# Patient Record
Sex: FEMALE | Race: WHITE | NOT HISPANIC OR LATINO | Employment: FULL TIME | URBAN - NONMETROPOLITAN AREA
[De-identification: names, ages, dates, MRNs, and addresses within clinical notes are randomized per-mention and may not be internally consistent; named-entity substitution may affect disease eponyms.]

---

## 2017-01-04 ENCOUNTER — OFFICE VISIT - GICH (OUTPATIENT)
Dept: FAMILY MEDICINE | Facility: OTHER | Age: 32
End: 2017-01-04

## 2017-01-04 ENCOUNTER — HISTORY (OUTPATIENT)
Dept: FAMILY MEDICINE | Facility: OTHER | Age: 32
End: 2017-01-04

## 2017-01-04 DIAGNOSIS — J02.9 ACUTE PHARYNGITIS: ICD-10-CM

## 2017-01-04 DIAGNOSIS — J02.0 STREPTOCOCCAL PHARYNGITIS: ICD-10-CM

## 2017-01-04 LAB — STREP A ANTIGEN - HISTORICAL: POSITIVE

## 2017-01-04 ASSESSMENT — PATIENT HEALTH QUESTIONNAIRE - PHQ9: SUM OF ALL RESPONSES TO PHQ QUESTIONS 1-9: 0

## 2017-03-28 ENCOUNTER — OFFICE VISIT - GICH (OUTPATIENT)
Dept: PEDIATRICS | Facility: OTHER | Age: 32
End: 2017-03-28

## 2017-03-28 ENCOUNTER — COMMUNICATION - GICH (OUTPATIENT)
Dept: INTERNAL MEDICINE | Facility: OTHER | Age: 32
End: 2017-03-28

## 2017-03-28 ENCOUNTER — HISTORY (OUTPATIENT)
Dept: PEDIATRICS | Facility: OTHER | Age: 32
End: 2017-03-28

## 2017-03-28 DIAGNOSIS — J01.90 ACUTE SINUSITIS: ICD-10-CM

## 2017-03-28 DIAGNOSIS — B96.89 OTHER SPECIFIED BACTERIAL AGENTS AS THE CAUSE OF DISEASES CLASSIFIED ELSEWHERE: ICD-10-CM

## 2017-06-19 ENCOUNTER — HISTORY (OUTPATIENT)
Dept: FAMILY MEDICINE | Facility: OTHER | Age: 32
End: 2017-06-19

## 2017-06-19 ENCOUNTER — OFFICE VISIT - GICH (OUTPATIENT)
Dept: FAMILY MEDICINE | Facility: OTHER | Age: 32
End: 2017-06-19

## 2017-06-19 DIAGNOSIS — Z00.00 ENCOUNTER FOR GENERAL ADULT MEDICAL EXAMINATION WITHOUT ABNORMAL FINDINGS: ICD-10-CM

## 2017-07-19 ENCOUNTER — AMBULATORY - GICH (OUTPATIENT)
Dept: FAMILY MEDICINE | Facility: OTHER | Age: 32
End: 2017-07-19

## 2017-07-19 ENCOUNTER — HISTORY (OUTPATIENT)
Dept: FAMILY MEDICINE | Facility: OTHER | Age: 32
End: 2017-07-19

## 2017-07-19 ENCOUNTER — OFFICE VISIT - GICH (OUTPATIENT)
Dept: FAMILY MEDICINE | Facility: OTHER | Age: 32
End: 2017-07-19

## 2017-07-19 DIAGNOSIS — N87.9 DYSPLASIA OF CERVIX UTERI: ICD-10-CM

## 2017-07-19 DIAGNOSIS — Z00.00 ENCOUNTER FOR GENERAL ADULT MEDICAL EXAMINATION WITHOUT ABNORMAL FINDINGS: ICD-10-CM

## 2017-07-28 LAB — HPV RESULTS - HISTORICAL: NEGATIVE

## 2017-12-27 NOTE — PROGRESS NOTES
Patient Information     Patient Name MRN Sex Megan Calderon [id number] Female 1985      Progress Notes by Rhea Sampson MD at 2017  5:39 PM     Author:  Rhea Sampson MD Service:  (none) Author Type:  Physician     Filed:  2017  5:39 PM Encounter Date:  2017 Status:  Signed     :  Rhea Sampson MD (Physician)            Notified of results via [employer]

## 2017-12-27 NOTE — PROGRESS NOTES
Patient Information     Patient Name MRN Sex Megan Calderon [id number] Female 1985      Progress Notes by Rhea Sampson MD at 2017  3:26 PM     Author:  Rhea Sampson MD Service:  (none) Author Type:  Physician     Filed:  2017  3:26 PM Encounter Date:  2017 Status:  Signed     :  Rhea Sampson MD (Physician)            Notified of results via [employer].

## 2017-12-27 NOTE — PROGRESS NOTES
Patient Information     Patient Name MRN Sex Megan Calderon [id number] Female 1985      Progress Notes by Rhea Sampson MD at 2017 12:45 PM     Author:  Rhea Sampson MD Service:  (none) Author Type:  Physician     Filed:  2017  8:08 AM Encounter Date:  2017 Status:  Signed     :  Rhea Sampson MD (Physician)            physical rescheduled.

## 2017-12-27 NOTE — PROGRESS NOTES
Patient Information     Patient Name MRN Sex     Megan Staples [id number] Female 1985      Progress Notes by Rhea Sampson MD at 2017  3:15 PM     Author:  Rhea Sampson MD Service:  (none) Author Type:  Physician     Filed:  2017  5:27 PM Encounter Date:  2017 Status:  Signed     :  Rhea Sampson MD (Physician)            ANNUAL PHYSICAL - FEMALE    HPI: Megan Staples is a 31 y.o. female who presents for a yearly exam.  Concerns include:     LSIL pap  with colposcopy, biopsy SINGH 1; normal pap with + high risk HPV , colp done during second trimester of pregnancy with exam findings consistent with mild dysplasia (no biopsies done) and plan to do PP colp and biopsies. PP colp was not done. Patient presents for physical 17. Plan for repeat pap and HPV, follow up depending on results.     Patient's last menstrual period was 2017 (exact date).   Contraception: tubal  Risk for STI?: no  Last pap: see above  Any hx of abnormal paps:  See abobe  FH of early CA?: no  Tobacco?: former  Calcium intake: no  DEXA: yes  Last mammo:  not indicated   Colonoscopy:  not indicated   Immunizations: up to date    Patient Active Problem List       Diagnosis  Date Noted     Encounter for sterilization  2015     Sterilization consult  2015     Supervision of normal pregnancy in second trimester  2015     OB Provider: TINO  Baby Doc: TBD  EDC: Estimated Date of Delivery: 12/9/15   Dates based on: early ultrasound  Blood Type: A Rh Positive   Rubella: +  Flu Shot:   Tdap: 2015   Sex of baby: girl on US  Circ planned: n/a  GBS:  Breastfeeding: (give breastfeeding book at 28w visit):   Vacuum Consent Reviewed: (around 28 weeks):          Cervical dysplasia  2015     LSIL pap  with colposcopy, biopsy SINGH 1; normal pap with + high risk HPV , colp done during second trimester of pregnancy with exam findings consistent with mild dysplasia (no biopsies done)  "and plan to do PP colp and biopsies. PP colp was not done. Patient presents for physical 17. Plan for repeat pap and HPV, follow up depending on results.         Tonsillith  2014     ECZEMA  2010     OTHER CHRONIC DISEASE OF TONSILS AND ADENOIDS  2010     ALLERGIC RHINITIS         Past Medical History:     Diagnosis  Date     SAB (spontaneous )     cytotec used for medical treatment      Uterine mass      Vaginal delivery     x 3        Past Surgical History:      Procedure  Laterality Date     LAPAROSCOPY ABDOMEN DIAGNOSTIC      Diagnostic laparoscopy       KY LIGATE FALLOPIAN TUBEPOSTPARTUM  2015              Social History     Social History        Marital status:       Spouse name: N/A     Number of children:  N/A     Years of education:  N/A     Occupational History      Not on file.     Social History Main Topics        Smoking status:  Former Smoker     Quit date: 2009     Smokeless tobacco:  Never Used     Alcohol use  No     Drug use:  No     Sexual activity:  Yes     Partners: Male     Other Topics  Concern     Not on file      Social History Narrative     . : João, works for [employer]    Patient works as  at [employer]    Son: DO SawyerB 02    Daughter: Mary  3/9/10    Daughter: María  11/26/15               Family History       Problem   Relation Age of Onset     Good Health  Mother      Heart Disease  Father      Atrial fibrillation       Allergies  Daughter        No current outpatient prescriptions on file.     No current facility-administered medications for this visit.      Medications have been reviewed by me and are current to the best of my knowledge and ability.       REVIEW OF SYSTEMS:  15 system ROS completed and negative other than: See HPI.    PHYSICAL EXAM:  /62  Pulse 76  Ht 1.638 m (5' 4.5\")  Wt 73.3 kg (161 lb 9.6 oz)  LMP 2017 (Exact Date)  " Breastfeeding? No  BMI 27.31 kg/m2  CONSTITUTIONAL:  Alert, cooperative, NAD.  EYES: No scleral icterus.  PERRLA.  Conjunctiva clear.  ENT/MOUTH: External ears and nose normal.  TMs normal.  Moist mucous membranes. Oropharynx clear.    ENDO: No thyromegaly or thyroid nodules.  LYMPH:  No cervical or supraclavicular LA.    BREASTS: No skin abnormalities, no erythema.  No discrete masses.  No nipple discharge, no axillary, supra- or infraclavicular LA.   CARDIOVASCULAR: Regular, S1, S2.  No S3 or S4.  No murmur/gallop/rub.  No peripheral edema.  RESPIRATORY: CTA bilaterally, no wheezes, rhonchi or rales.  GI: Bowel sounds wnl.  Soft, nontender, nondistended.  No masses or HSM.  No rebound or guarding.  : Vulva: normal, no lesions or discharge  Urethral meatus: normal size and location, no lesions or discharge  Urethra: no tenderness or masses  Bladder: no fullness or tenderness  Vagina: normal appearance, no abnormal discharge, no lesions.  No evidence of cystocele or rectocele.  Cervix: normal appearance, no lesions, no abnormal discharge, no cervical motion tenderness  Uterus: normal size and position, mobile, non-tender  Pap smear obtained: yes  Adnexa: no palpable masses bilaterally  MSKEL: Grossly normal ROM.  No clubbing.  INTEGUMENTARY:  Warm, dry.  No rash noted on exposed skin.  NEUROLOGIC: Facies symmetric.  Grossly normal movement and tone.  No tremor.  PSYCHIATRIC: Affect normal.  Speech fluent.  Though content linear.     ASSESSMENT/PLAN:    ICD-10-CM    1. Health care maintenance Z00.00    2. Cervical dysplasia N87.9 GYN THIN PREP PAP SCREEN IMAGED      GYN THIN PREP PAP SCREEN IMAGED       Relevant cancer screening discussed.    Counseled on healthy diet, Calcium and vitamin D intake, and exercise.    Rhea Sampson MD

## 2017-12-28 NOTE — ADDENDUM NOTE
Patient Information     Patient Name MRN Megan Guevara [id number] Female 1985      Addendum Note by Yael Collier at 2017  2:02 PM     Author:  Yael Collier Service:  (none) Author Type:  (none)     Filed:  2017  2:02 PM Encounter Date:  2017 Status:  Signed     :  Yael Collier       Addended by: YAEL COLLIER on: 2017 02:02 PM        Modules accepted: Orders

## 2017-12-30 NOTE — NURSING NOTE
Patient Information     Patient Name MRN Sex Megan Calderon [id number] Female 1985      Nursing Note by Lois Tanner at 2017  3:15 PM     Author:  Lois Tanner Service:  (none) Author Type:  (none)     Filed:  2017  3:36 PM Encounter Date:  2017 Status:  Signed     :  Lois Tanner            Patient here for yearly physical.  Lois Tanner LPN ..........2017 3:20 PM

## 2017-12-30 NOTE — NURSING NOTE
Patient Information     Patient Name MRN Sex Megan Calderon [id number] Female 1985      Nursing Note by Lois Tanner at 2017 12:45 PM     Author:  Lois Tanner Service:  (none) Author Type:  (none)     Filed:  2017  1:59 PM Encounter Date:  2017 Status:  Signed     :  Lois Tanner            Patient  Here for yearly physical.  Lois Tanner LPN ..........2017 12:49 PM

## 2018-01-02 NOTE — PROGRESS NOTES
Patient Information     Patient Name MRN Sex Megan Calderon [id number] Female 1985      Progress Notes by Anamika Simms NP at 2017  9:30 AM     Author:  Anamika Simms NP Service:  (none) Author Type:  PHYS- Nurse Practitioner     Filed:  2017 10:12 AM Encounter Date:  2017 Status:  Signed     :  Anamika Simms NP (PHYS- Nurse Practitioner)            Nursing Notes:   Elvis Betancourt  2017  9:20 AM  Signed  Patient here with a bit of a sore throat.  Elvis Betancourt CMA..............2017   9:19 AM    SUBJECTIVE:    Megan Staples is a 31 y.o. female who presents for sore throat for one day    Pharyngitis    This is a new problem. The current episode started yesterday. The problem has been unchanged. The pain is worse on the right side. There has been no fever. The pain is moderate. Associated symptoms include swollen glands. Pertinent negatives include no congestion, coughing, drooling, hoarse voice, neck pain, shortness of breath, stridor or trouble swallowing. She has had no exposure to strep or mono. She has tried NSAIDs for the symptoms. The treatment provided moderate relief.       No current outpatient prescriptions on file prior to visit.     No current facility-administered medications on file prior to visit.        REVIEW OF SYSTEMS:  Review of Systems   HENT: Negative for congestion, drooling, hoarse voice and trouble swallowing.    Respiratory: Negative for cough, shortness of breath and stridor.    Musculoskeletal: Negative for neck pain.       OBJECTIVE:  /70  Pulse 76  Temp 98.4  F (36.9  C) (Tympanic)  Wt 74.8 kg (165 lb)  BMI 27.46 kg/m2    EXAM:   Physical Exam   Constitutional: She is well-developed, well-nourished, and in no distress.   HENT:   Head: Normocephalic and atraumatic.   Right Ear: Tympanic membrane and ear canal normal.   Left Ear: Tympanic membrane and ear canal normal.   Nose: Nose normal.   Mouth/Throat: Uvula is  midline. Oropharyngeal exudate and posterior oropharyngeal erythema present. No posterior oropharyngeal edema.   Bilateral Tonsil +2, RT with exudates   Eyes: Conjunctivae are normal.   Neck: Neck supple.   RT sided Lymph node swelling.    Cardiovascular: Normal rate, regular rhythm and normal heart sounds.    Pulmonary/Chest: Effort normal and breath sounds normal. No respiratory distress. She has no wheezes. She has no rales.   Lymphadenopathy:     She has cervical adenopathy.        Right cervical: Superficial cervical and posterior cervical adenopathy present.   Skin: Skin is warm and dry. No rash noted.   Nursing note and vitals reviewed.    Results for orders placed or performed in visit on 01/04/17      THROAT RAPID STREP A WITH REFLEX      Result  Value Ref Range    STREP A ANTIGEN           Positive (A) Negative     Completed labs at today's visit Rapid Strep.  I personally reviewed the labs with the patient/parent at the visit. Abnormalities include + strep.     ASSESSMENT/PLAN:    ICD-10-CM    1. Sore throat J02.9 THROAT RAPID STREP A WITH REFLEX      THROAT RAPID STREP A WITH REFLEX   2. Strep throat J02.0 azithromycin (ZITHROMAX) 250 mg tablet      DISCONTINUED: penicillin g benzathine 1,200,000 Units injection (BICILLIN-LA)        Plan:  At first she wanted a shot of PCN, then realized she had to go to the store and wanted oral Abx. Rx sent in. Home cares and otc gone over. Hand hygiene discussed. I explained my diagnostic considerations and recommendations to the patient, who voiced understanding and agreement with the treatment plan. All questions were answered. We discussed potential side effects of any prescribed or recommended therapies, as well as expectations for response to treatments. She was advised to contact our office if there is no improvement or worsening of conditions or symptoms.  If s/s worsen or persist, patient will either come back or follow up with PCP.       AMBER OLIVA NP  ....................  1/4/2017   10:12 AM

## 2018-01-02 NOTE — NURSING NOTE
Patient Information     Patient Name MRN Megan Guevara [id number] Female 1985      Nursing Note by Elvis Betancourt at 2017  9:30 AM     Author:  Elvis Betancourt Service:  (none) Author Type:  (none)     Filed:  2017  9:20 AM Encounter Date:  2017 Status:  Signed     :  Elvis Betancourt            Patient here with a bit of a sore throat.  Elvis Betancourt CMA..............2017   9:19 AM

## 2018-01-02 NOTE — PATIENT INSTRUCTIONS
Patient Information     Patient Name MRN Megan Guevara [id number] Female 1985      Patient Instructions by Anamika Simms NP at 2017  9:30 AM     Author:  Anamika Simms NP Service:  (none) Author Type:  PHYS- Nurse Practitioner     Filed:  2017  9:44 AM Encounter Date:  2017 Status:  Signed     :  Anamika Simms NP (PHYS- Nurse Practitioner)            Cold Medicines   What are cold medicines?  Symptoms of the common cold start gradually over several days and usually last about two weeks. Symptoms may include sneezing, a stuffy or runny nose, sore throat, cough, watery eyes, mild headache, or body aches. A cold will go away on its own without treatment. However, there are many nonprescription products that may help relieve some of the symptoms of a cold. Cold medicines often contain more than one ingredient and are used to treat more than one symptom. Read the labels and buy products that have only the ingredients that you need. If you are not sure which medicine is best, ask your pharmacist.  How do they work?  Decongestants reduce swelling in your nose and sinuses. They may also lessen the amount of mucus made by your nose. If you use decongestants more often than directed, your stuffy nose may get worse.   Antihistamines block the effect of histamine. Histamine is a chemical your body makes when you have an allergic reaction. Antihistamines are most often used to treat itchy or watery eyes or a stuffy or runny nose caused by an allergy. Antihistamines may not help a stuffy or runny nose caused by a cold because they can make mucus thick and dry.  Mucolytics are medicines that make mucus thinner so that it is easier to cough up out of your throat and lungs.  Expectorants are cough medicines that may help to keep the mucus thin and bring up mucus from the lungs when you cough. This may relieve chest congestion and make it easier to breathe.   Cough suppressants  (antitussives) are medicines that lessen the urge to cough. They may give relief from a dry, hacking cough. If you have a cough that is wet sounding and produces mucus, it is important for you to cough the mucus up out of your lungs. For this reason, cough suppressants are not recommended for a wet sounding cough.  Fever and pain relievers, such as acetaminophen, aspirin, or other nonsteroidal anti-inflammatory drugs (NSAIDs), are often included in cold medicine. Read labels carefully to avoid taking more medicine than you need.  What else do I need to know about this medicine?    Talk to your healthcare provider if your symptoms start suddenly or you have severe symptoms. This may mean you have something more serious than a cold.    Follow the directions that come with your medicine, including information about food or alcohol. Make sure you know how and when to take your medicine. Do not take more or less than you are supposed to take.    Try to get all of your medicine at the same place. Your pharmacist can help make sure that all of your medicines are safe to take together.    Keep a list of your medicines with you. List all of the prescription medicines, nonprescription medicines, supplements, natural remedies, and vitamins that you take. Tell all healthcare providers who treat you about all of the products you are taking.    Many medicines have side effects. A side effect is a symptom or problem that is caused by the medicine. Ask your healthcare provider or pharmacist what side effects the medicine may cause and what you should do if you have side effects.    Nonsteroidal anti-inflammatory medicines (NSAIDs), such as ibuprofen, naproxen, and aspirin, may cause stomach bleeding and other problems. These risks increase with age. Read the label and take as directed. Unless recommended by your healthcare provider, do not take for more than 10 days for any reason.    Acetaminophen may cause liver damage or other  problems. Unless recommended by your provider, don't take more than 3000 milligrams (mg) in 24 hours. To make sure you don t take too much, check other medicines you take to see if they also contain acetaminophen. Ask your provider if you need to avoid drinking alcohol while taking this medicine.  If you have any questions, ask your healthcare provider or pharmacist for more information. Be sure to keep all appointments for provider visits or tests.

## 2018-01-04 NOTE — NURSING NOTE
Patient Information     Patient Name MRN Megan Guevara [id number] Female 1985      Nursing Note by Mary Jo Richard at 3/28/2017  3:15 PM     Author:  Mary Jo Richard Service:  (none) Author Type:  (none)     Filed:  3/28/2017  3:34 PM Encounter Date:  3/28/2017 Status:  Signed     :  Mary Jo Richard            Patient presents to clinic for ongoing cough and congestion for about 2-3 weeks ago.  Mary Jo Richard LPN ....................  3/28/2017   3:01 PM

## 2018-01-04 NOTE — TELEPHONE ENCOUNTER
Patient Information     Patient Name MRN Sex Megan Calderon [id number] Female 1985      Telephone Encounter by Rhea Wharton at 3/28/2017  8:00 AM     Author:  Rhea Wharton Service:  (none) Author Type:  (none)     Filed:  3/28/2017  8:06 AM Encounter Date:  3/28/2017 Status:  Signed     :  Rhea Wharton            PATIENT WOULD LIKE TO BE SEEN FOR PNEUMONIA AT DAUGHTERS APPOINTMENT AT 3:15 TODAY.  PLEASE CALL TO DISCUSS AND ADVISE.  Rhea Wharton ....................  3/28/2017   8:06 AM

## 2018-01-04 NOTE — TELEPHONE ENCOUNTER
Patient Information     Patient Name MRN Sex Megan Calderon [id number] Female 1985      Telephone Encounter by Valerio Howard MD at 3/28/2017  8:56 AM     Author:  Valerio Howard MD Service:  (none) Author Type:  Physician     Filed:  3/28/2017  8:56 AM Encounter Date:  3/28/2017 Status:  Signed     :  Valerio Howard MD (Physician)            Okay to double book.    Signed, Valerio Howard MD  [facility] Medicine [facility]

## 2018-01-04 NOTE — PATIENT INSTRUCTIONS
Patient Information     Patient Name MRN Megan Guevara [id number] Female 1985      Patient Instructions by Valerio Howard MD at 3/28/2017  3:15 PM     Author:  Valerio Howard MD Service:  (none) Author Type:  Physician     Filed:  3/28/2017  3:45 PM Encounter Date:  3/28/2017 Status:  Signed     :  Valerio Howard MD (Physician)             -- Augmentin   -- Eat yogurt 1-2 times per day while on antibiotics (and for a few weeks after) to reduce the chances of diarrhea     -- Use nasal saline spray and/or Neti pot (with distilled water)   -- Salt water gargle a few times per day for sore throat   -- For nasal congestion, okay to use Afrin 2 sprays both nostrils daily, no more than 3-4 days then stop as can cause rebound congestion   -- Benadryl 25 mg up to 3x/day   -- Drink warm liquids (eg apple juice, tea, chicken soup)   -- Look for benzocaine sore throat drops   -- Honey mixed with hot water or tea for cough   -- Over-the-counter cough/cold medications not recommended   -- Okay to use acetaminophen (Tylenol) and ibuprofen (Advil)   -- Watch for dehydration, try to stay hydrated   -- If symptoms are not improving over 7-10 days, or worse at any point return for evaluation.

## 2018-01-04 NOTE — TELEPHONE ENCOUNTER
Patient Information     Patient Name MRN Megan Guevara [id number] Female 1985      Telephone Encounter by Mary Jo Richard at 3/28/2017  8:40 AM     Author:  Mary Jo Richard Service:  (none) Author Type:  (none)     Filed:  3/28/2017  8:41 AM Encounter Date:  3/28/2017 Status:  Signed     :  Mary Jo Richard            Would you like to work this mother in with her daughter at 3:15 today?  Mary Jo Richard LPN ....................  3/28/2017   8:41 AM

## 2018-01-04 NOTE — TELEPHONE ENCOUNTER
Patient Information     Patient Name MRN Megan Guevara [id number] Female 1985      Telephone Encounter by Mary Jo Richard at 3/28/2017  9:01 AM     Author:  Mary Jo Richard Service:  (none) Author Type:  (none)     Filed:  3/28/2017  9:01 AM Encounter Date:  3/28/2017 Status:  Signed     :  Mary Jo Richard            Patient notified and will be here at 3:00 pm with daughter.  Mary Jo Richard LPN ....................  3/28/2017   9:01 AM

## 2018-01-26 VITALS
OXYGEN SATURATION: 97 % | HEART RATE: 68 BPM | BODY MASS INDEX: 26.79 KG/M2 | TEMPERATURE: 98.6 F | HEIGHT: 65 IN | SYSTOLIC BLOOD PRESSURE: 104 MMHG | BODY MASS INDEX: 27.49 KG/M2 | WEIGHT: 165 LBS | DIASTOLIC BLOOD PRESSURE: 78 MMHG | HEART RATE: 98 BPM | WEIGHT: 161 LBS | DIASTOLIC BLOOD PRESSURE: 62 MMHG | SYSTOLIC BLOOD PRESSURE: 106 MMHG

## 2018-01-26 VITALS
BODY MASS INDEX: 28.31 KG/M2 | TEMPERATURE: 98.4 F | WEIGHT: 165 LBS | HEART RATE: 76 BPM | SYSTOLIC BLOOD PRESSURE: 120 MMHG | DIASTOLIC BLOOD PRESSURE: 70 MMHG

## 2018-01-26 VITALS
DIASTOLIC BLOOD PRESSURE: 62 MMHG | BODY MASS INDEX: 26.92 KG/M2 | HEIGHT: 65 IN | WEIGHT: 161.6 LBS | SYSTOLIC BLOOD PRESSURE: 102 MMHG | HEART RATE: 76 BPM

## 2018-01-29 ENCOUNTER — DOCUMENTATION ONLY (OUTPATIENT)
Dept: FAMILY MEDICINE | Facility: OTHER | Age: 33
End: 2018-01-29

## 2018-01-29 PROBLEM — J30.9 ALLERGIC RHINITIS: Status: ACTIVE | Noted: 2018-01-29

## 2018-02-02 ASSESSMENT — PATIENT HEALTH QUESTIONNAIRE - PHQ9: SUM OF ALL RESPONSES TO PHQ QUESTIONS 1-9: 0

## 2019-05-17 ENCOUNTER — OFFICE VISIT (OUTPATIENT)
Dept: FAMILY MEDICINE | Facility: OTHER | Age: 34
End: 2019-05-17
Attending: NURSE PRACTITIONER
Payer: COMMERCIAL

## 2019-05-17 VITALS
RESPIRATION RATE: 14 BRPM | WEIGHT: 150.5 LBS | HEART RATE: 76 BPM | BODY MASS INDEX: 25.08 KG/M2 | TEMPERATURE: 98.2 F | HEIGHT: 65 IN | DIASTOLIC BLOOD PRESSURE: 70 MMHG | SYSTOLIC BLOOD PRESSURE: 104 MMHG

## 2019-05-17 DIAGNOSIS — N30.01 ACUTE CYSTITIS WITH HEMATURIA: Primary | ICD-10-CM

## 2019-05-17 DIAGNOSIS — R39.9 UTI SYMPTOMS: ICD-10-CM

## 2019-05-17 LAB
ALBUMIN UR-MCNC: NEGATIVE MG/DL
APPEARANCE UR: CLEAR
BACTERIA #/AREA URNS HPF: ABNORMAL /HPF
BILIRUB UR QL STRIP: NEGATIVE
COLOR UR AUTO: YELLOW
GLUCOSE UR STRIP-MCNC: NEGATIVE MG/DL
HGB UR QL STRIP: ABNORMAL
KETONES UR STRIP-MCNC: NEGATIVE MG/DL
LEUKOCYTE ESTERASE UR QL STRIP: ABNORMAL
NITRATE UR QL: NEGATIVE
PH UR STRIP: 5.5 PH (ref 5–9)
RBC #/AREA URNS AUTO: ABNORMAL /HPF
SOURCE: ABNORMAL
SP GR UR STRIP: 1.01 (ref 1–1.03)
UROBILINOGEN UR STRIP-ACNC: 0.2 EU/DL (ref 0.2–1)
WBC #/AREA URNS AUTO: ABNORMAL /HPF

## 2019-05-17 PROCEDURE — 99214 OFFICE O/P EST MOD 30 MIN: CPT | Performed by: NURSE PRACTITIONER

## 2019-05-17 PROCEDURE — 87086 URINE CULTURE/COLONY COUNT: CPT | Mod: ZL | Performed by: NURSE PRACTITIONER

## 2019-05-17 PROCEDURE — 81001 URINALYSIS AUTO W/SCOPE: CPT | Mod: ZL | Performed by: NURSE PRACTITIONER

## 2019-05-17 RX ORDER — NITROFURANTOIN 25; 75 MG/1; MG/1
100 CAPSULE ORAL 2 TIMES DAILY
Qty: 10 CAPSULE | Refills: 0 | Status: SHIPPED | OUTPATIENT
Start: 2019-05-17 | End: 2019-09-04

## 2019-05-17 ASSESSMENT — PAIN SCALES - GENERAL: PAINLEVEL: NO PAIN (0)

## 2019-05-17 ASSESSMENT — MIFFLIN-ST. JEOR: SCORE: 1383.54

## 2019-05-17 NOTE — NURSING NOTE
Patient presents to clinic today for dysuria starting a couple days ago.     No LMP recorded.  Medication Reconciliation: complete    Alberta Wharton LPN  5/17/2019 10:46 AM

## 2019-05-17 NOTE — PROGRESS NOTES
"HPI:    Megan Staples is a 33 year old female  who presents to clinic today for UTI concerns.    Symptoms for the past 3 days.  Symptoms include dysuria, painful urination, and pressure.  No frequency or urgency.  Normal urination amount.  No fevers.  Chills.  No nausea or vomiting.  No change in appetite.  Drinking extra fluids.  No diarrhea or constipation.  No abdominal pain.  No back pain.  Scant blood initially with wiping, no blood noted in urine.  LMP - due in about a week.  No pregnancy concerns.  No STD concerns.  No vaginal itching or discharge.  No hx of frequent UTIs.  No change in soaps or new products.      No OTC medications.        Past Medical History:   Diagnosis Date     Complete or unspecified spontaneous  without complication     cytotec used for medical treatment     Encounter for full-term uncomplicated delivery     x 3     Noninflammatory disorder of uterus     No Comments Provided     Past Surgical History:   Procedure Laterality Date     LAPAROSCOPY DIAGNOSTIC (GENERAL)      ,Diagnostic laparoscopy     OTHER SURGICAL HISTORY      2015,83382.0,[place] LIGATE FALLOPIAN TUBEPOSTPARTUM     Social History     Tobacco Use     Smoking status: Former Smoker     Last attempt to quit: 2009     Years since quitting: 10.1     Smokeless tobacco: Never Used   Substance Use Topics     Alcohol use: No     Alcohol/week: 0.0 oz     No current outpatient medications on file.     No Known Allergies      Past medical history, past surgical history, current medications and allergies reviewed and accurate to the best of my knowledge.        ROS:  Refer to HPI    /70 (BP Location: Right arm, Patient Position: Sitting, Cuff Size: Adult Regular)   Pulse 76   Temp 98.2  F (36.8  C) (Tympanic)   Resp 14   Ht 1.651 m (5' 5\")   Wt 68.3 kg (150 lb 8 oz)   LMP  (Within Weeks)   BMI 25.04 kg/m      EXAM:  General Appearance: Well appearing adult female, non ill appearance, appropriate " appearance for age. No acute distress  Eyes: conjunctivae normal without erythema or irritation, no drainage or crusting, no eyelid swelling, pupils equal   Orophayrnx: moist mucous membranes, voice clear.  Respiratory: normal chest wall and respirations.  Normal effort.  Clear to auscultation bilaterally, no wheezing, crackles or rhonchi.  No increased work of breathing.  No cough appreciated.  Cardiac: RRR with no murmurs  Abdomen: soft, nontender, no masses or organomegally, no rebound tenderness or guarding, normal bowel sounds present  :  Mild suprapubic tenderness/pressure to palpation.  No CVA tenderness to palpation.    Musculoskeletal:  Normal gait.  Equal movement of bilateral upper extremities.  Equal movement of bilateral lower extremities.    Psychological: normal affect, alert and pleasant      Labs:  Results for orders placed or performed in visit on 05/17/19   *UA reflex to Microscopic   Result Value Ref Range    Color Urine Yellow     Appearance Urine Clear     Glucose Urine Negative NEG^Negative mg/dL    Bilirubin Urine Negative NEG^Negative    Ketones Urine Negative NEG^Negative mg/dL    Specific Gravity Urine 1.010 1.000 - 1.030    Blood Urine Small (A) NEG^Negative    pH Urine 5.5 5.0 - 9.0 pH    Protein Albumin Urine Negative NEG^Negative mg/dL    Urobilinogen Urine 0.2 0.2 - 1.0 EU/dL    Nitrite Urine Negative NEG^Negative    Leukocyte Esterase Urine Small (A) NEG^Negative    Source Midstream Urine    Urine Microscopic   Result Value Ref Range    WBC Urine 5-10 (A) OTO5^0 - 5 /HPF    RBC Urine 2-5 (A) OTO2^O - 2 /HPF    Bacteria Urine Few (A) NEG^Negative /HPF             ASSESSMENT/PLAN:  1. UTI symptoms    - *UA reflex to Microscopic  - Urine Microscopic  - Urine Culture Aerobic Bacterial    2. Acute cystitis with hematuria      Urinalysis - small blood, small leukocyte estrace, negative nitrite  Urine microscopic - trace RBCs, small WBCs, few bacteria    Urine culture pending  Will call  if culture warrants change of abx.     - nitroFURantoin macrocrystal-monohydrate (MACROBID) 100 MG capsule; Take 1 capsule (100 mg) by mouth 2 times daily for 5 days  Dispense: 10 capsule; Refill: 0    May use Pyridium OTC PRN.     Encouraged fluids and frequent bladder emptying.    Discussed warning signs/symptoms indicative of need to f/u    Follow up if symptoms persist or worsen or concerns

## 2019-05-19 LAB
BACTERIA SPEC CULT: NO GROWTH
SPECIMEN SOURCE: NORMAL

## 2019-09-04 ENCOUNTER — OFFICE VISIT (OUTPATIENT)
Dept: FAMILY MEDICINE | Facility: OTHER | Age: 34
End: 2019-09-04
Attending: FAMILY MEDICINE
Payer: COMMERCIAL

## 2019-09-04 VITALS
HEIGHT: 65 IN | TEMPERATURE: 98.7 F | DIASTOLIC BLOOD PRESSURE: 68 MMHG | WEIGHT: 158.6 LBS | RESPIRATION RATE: 16 BRPM | SYSTOLIC BLOOD PRESSURE: 104 MMHG | HEART RATE: 78 BPM | BODY MASS INDEX: 26.42 KG/M2

## 2019-09-04 DIAGNOSIS — Z00.00 HEALTH CARE MAINTENANCE: Primary | ICD-10-CM

## 2019-09-04 DIAGNOSIS — N89.8 VAGINAL DISCHARGE: ICD-10-CM

## 2019-09-04 LAB
CHOLEST SERPL-MCNC: 173 MG/DL
HDLC SERPL-MCNC: 71 MG/DL (ref 23–92)
LDLC SERPL CALC-MCNC: 94 MG/DL
NONHDLC SERPL-MCNC: 102 MG/DL
SPECIMEN SOURCE: NORMAL
TRIGL SERPL-MCNC: 39 MG/DL
WET PREP SPEC: NORMAL

## 2019-09-04 PROCEDURE — 99395 PREV VISIT EST AGE 18-39: CPT | Performed by: FAMILY MEDICINE

## 2019-09-04 PROCEDURE — 80061 LIPID PANEL: CPT | Mod: ZL | Performed by: FAMILY MEDICINE

## 2019-09-04 PROCEDURE — 36415 COLL VENOUS BLD VENIPUNCTURE: CPT | Mod: ZL | Performed by: FAMILY MEDICINE

## 2019-09-04 PROCEDURE — 87210 SMEAR WET MOUNT SALINE/INK: CPT | Mod: ZL | Performed by: FAMILY MEDICINE

## 2019-09-04 ASSESSMENT — PAIN SCALES - GENERAL: PAINLEVEL: NO PAIN (0)

## 2019-09-04 ASSESSMENT — MIFFLIN-ST. JEOR: SCORE: 1420.28

## 2019-09-04 NOTE — PROGRESS NOTES
SUBJECTIVE:   Megan Staples is a 33 year old female who presents to clinic today for a physical:     Patient notes occasional L pelvic pain around the time of ovulation followed by thick, clear vaginal discharge.       Contraception: tubal  Risk for STI?: no  Last pap: 2017 with neg HPV  Any hx of abnormal paps:  Yes, see problem list  FH of early CA?: no  Tobacco?: no  Calcium intake: yes  DEXA:  not indicated   Last mammo:  not indicated   Colonoscopy:  not indicated   Immunizations: up to date        Patient Active Problem List    Diagnosis Date Noted     Allergic rhinitis 2018     Priority: Medium     Cervical dysplasia 2015     Priority: Medium     Overview:   LSIL pap  with colposcopy, biopsy SINGH 1; normal pap with + high risk HPV , colp done during second trimester of pregnancy with exam findings consistent with mild dysplasia (no biopsies done) and plan to do PP colp and biopsies. PP colp was not done. Patient presents for physical 17, pap and HPV testing negative. Resume normal intervals.        Tonsillith 2014     Priority: Medium     Contact dermatitis and eczema 2010     Priority: Medium     Other chronic disease of tonsils and adenoids 2010     Priority: Medium     Past Medical History:   Diagnosis Date     Complete or unspecified spontaneous  without complication     cytotec used for medical treatment     Encounter for full-term uncomplicated delivery     x 3     Noninflammatory disorder of uterus     No Comments Provided      Past Surgical History:   Procedure Laterality Date     LAPAROSCOPY DIAGNOSTIC (GENERAL)      ,Diagnostic laparoscopy     OTHER SURGICAL HISTORY      2015,94991.0,[place] LIGATE FALLOPIAN TUBEPOSTPARTUM     Family History   Problem Relation Age of Onset     Family History Negative Mother         Good Health     Heart Disease Father         Heart Disease,Atrial fibrillation     No Known Problems Sister      Allergy  "(Severe) Daughter         Allergies     Social History     Tobacco Use     Smoking status: Former Smoker     Last attempt to quit: 2009     Years since quitting: 10.4     Smokeless tobacco: Never Used   Substance Use Topics     Alcohol use: No     Alcohol/week: 0.0 oz     Social History     Social History Narrative    . : João, works for [employer]  Patient works as  at [employer]  Son: Sawyer,  02  Daughter: Mary,  3/9/10  Daughter: María  11/26/15         Review of Systems see HPI, ROS otherwise negative.     OBJECTIVE:     /68 (BP Location: Right arm, Patient Position: Sitting, Cuff Size: Adult Regular)   Pulse 78   Temp 98.7  F (37.1  C) (Tympanic)   Resp 16   Ht 1.651 m (5' 5\")   Wt 71.9 kg (158 lb 9.6 oz)   LMP 2019   BMI 26.39 kg/m    Body mass index is 26.39 kg/m .  Physical Exam   Constitutional: She is oriented to person, place, and time. She appears well-developed and well-nourished.   HENT:   TMs appear normal.    Eyes: Conjunctivae are normal.   Neck: No thyromegaly present.   Cardiovascular: Normal rate, regular rhythm and normal heart sounds.   No murmur heard.  Pulmonary/Chest: Effort normal and breath sounds normal. No respiratory distress.   Abdominal: Soft.   Genitourinary:   Genitourinary Comments: Pelvic exam: normal vagina and vulva, cervix without tenderness, uterus normal size anteverted, adenxa normal in size without tenderness.     Musculoskeletal: She exhibits no edema.   Lymphadenopathy:     She has no cervical adenopathy.   Neurological: She is alert and oriented to person, place, and time.   Skin: No rash noted.   Psychiatric: She has a normal mood and affect.       ASSESSMENT/PLAN:           ICD-10-CM    1. Health care maintenance Z00.00 Lipid Panel       Patient description is consistent with mitisatuschmerz, overall reassured, exam normal. Reviewed current cervical cancer screening " recommendations. Fasting lipids today.     Relevant cancer screening discussed.    Counseled on healthy diet, Calcium and vitamin D intake, and exercise.       Rhea Sampson MD  [facility] AND [facility]

## 2019-09-04 NOTE — NURSING NOTE
"Patient here for yearly physical.   Lois Tanner LPN ..........9/4/2019 8:19 AM   Chief Complaint   Patient presents with     Physical     yearly       Initial /68 (BP Location: Right arm, Patient Position: Sitting, Cuff Size: Adult Regular)   Pulse 78   Temp 98.7  F (37.1  C) (Tympanic)   Resp 16   Ht 1.651 m (5' 5\")   Wt 71.9 kg (158 lb 9.6 oz)   LMP 08/05/2019   BMI 26.39 kg/m   Estimated body mass index is 26.39 kg/m  as calculated from the following:    Height as of this encounter: 1.651 m (5' 5\").    Weight as of this encounter: 71.9 kg (158 lb 9.6 oz).  Medication Reconciliation: complete    Lois Tanner LPN    "

## 2020-01-13 ENCOUNTER — OFFICE VISIT (OUTPATIENT)
Dept: FAMILY MEDICINE | Facility: OTHER | Age: 35
End: 2020-01-13
Attending: FAMILY MEDICINE
Payer: COMMERCIAL

## 2020-01-13 DIAGNOSIS — T75.3XXA MOTION SICKNESS, INITIAL ENCOUNTER: Primary | ICD-10-CM

## 2020-01-13 PROCEDURE — 99213 OFFICE O/P EST LOW 20 MIN: CPT | Performed by: FAMILY MEDICINE

## 2020-01-13 RX ORDER — SCOLOPAMINE TRANSDERMAL SYSTEM 1 MG/1
1 PATCH, EXTENDED RELEASE TRANSDERMAL
Qty: 6 PATCH | Refills: 0 | Status: SHIPPED | OUTPATIENT
Start: 2020-01-13 | End: 2021-11-02

## 2020-01-13 ASSESSMENT — PAIN SCALES - GENERAL: PAINLEVEL: NO PAIN (0)

## 2020-01-13 NOTE — NURSING NOTE
"Patient is here to discuss medication for motion sickness. She leaves on 1/28/2020 for cruise, will be on the boat for about a week.   Lois Tanner LPN ..........1/13/2020 8:13 AM     Chief Complaint   Patient presents with     Medication Request     motion sickness meds       Initial There were no vitals taken for this visit. Estimated body mass index is 26.39 kg/m  as calculated from the following:    Height as of 9/4/19: 1.651 m (5' 5\").    Weight as of 9/4/19: 71.9 kg (158 lb 9.6 oz).  Medication Reconciliation: complete    Lois Tanner LPN    "

## 2020-01-13 NOTE — PROGRESS NOTES
SUBJECTIVE:   Megan Staples is a 34 year old female who presents to clinic today for the following health issues:    Going on a cruise for the first time ever. Concerned about motion sickness.         Review of Systems see HPI, ROS otherwise negative.     OBJECTIVE:     BP (P) 108/62 (BP Location: Right arm, Patient Position: Sitting, Cuff Size: Adult Regular)   Pulse (P) 72   Temp (P) 98.6  F (37  C) (Tympanic)   Resp (P) 18   Wt (P) 73.3 kg (161 lb 9.6 oz)   LMP 12/27/2019   BMI (P) 26.89 kg/m    Body mass index is 26.89 kg/m  (pended).  Physical Exam  Constitutional:       Appearance: She is well-developed.   HENT:      Right Ear: External ear normal.      Left Ear: External ear normal.   Eyes:      General: No scleral icterus.     Conjunctiva/sclera: Conjunctivae normal.   Cardiovascular:      Rate and Rhythm: Normal rate.   Pulmonary:      Effort: Pulmonary effort is normal. No respiratory distress.   Skin:     Findings: No rash.   Neurological:      Mental Status: She is alert.         ASSESSMENT/PLAN:           ICD-10-CM    1. Motion sickness, initial encounter T75.3XXA scopolamine (TRANSDERM) 1 MG/3DAYS 72 hr patch     Discussed options. Recommend scopolamine patches. Prescription sent. Reviewed side effects.     Rhea Sampson MD  [facility] AND [facility]

## 2020-02-10 ENCOUNTER — OFFICE VISIT (OUTPATIENT)
Dept: FAMILY MEDICINE | Facility: OTHER | Age: 35
End: 2020-02-10
Attending: NURSE PRACTITIONER
Payer: COMMERCIAL

## 2020-02-10 VITALS
OXYGEN SATURATION: 98 % | HEART RATE: 76 BPM | WEIGHT: 161.9 LBS | RESPIRATION RATE: 16 BRPM | TEMPERATURE: 97.7 F | HEIGHT: 64 IN | SYSTOLIC BLOOD PRESSURE: 110 MMHG | DIASTOLIC BLOOD PRESSURE: 72 MMHG | BODY MASS INDEX: 27.64 KG/M2

## 2020-02-10 DIAGNOSIS — K13.70 ORAL MUCOSAL LESION: Primary | ICD-10-CM

## 2020-02-10 PROCEDURE — 99213 OFFICE O/P EST LOW 20 MIN: CPT | Performed by: NURSE PRACTITIONER

## 2020-02-10 ASSESSMENT — MIFFLIN-ST. JEOR: SCORE: 1413.99

## 2020-02-10 ASSESSMENT — PAIN SCALES - GENERAL: PAINLEVEL: NO PAIN (0)

## 2020-02-10 NOTE — NURSING NOTE
"Chief Complaint   Patient presents with     Pharyngitis     x 4 days       Initial /72   Pulse 76   Temp 97.7  F (36.5  C) (Tympanic)   Resp 16   Ht 1.625 m (5' 3.98\")   Wt 73.4 kg (161 lb 14.4 oz)   SpO2 98%   BMI 27.81 kg/m   Estimated body mass index is 27.81 kg/m  as calculated from the following:    Height as of this encounter: 1.625 m (5' 3.98\").    Weight as of this encounter: 73.4 kg (161 lb 14.4 oz).  Medication Reconciliation: complete    Maria Luisa Gracia LPN  "

## 2020-02-10 NOTE — PROGRESS NOTES
"HPI:    Megan Staples is a 34 year old female  who presents to clinic today for spots on throat.    Back of throat with spots.  Corner of lip feels numb.  Symptoms started about 4 days ago.  No pain with swallowing.  Burning in chest.  Just returned home from cruise to [place], [facility], and [facility].  Ate a lot of seafood - calarari and scallops which are atypical.  Increased fruit juices.  No throat swelling.  No fevers.  No cough.  No vomiting or diarrhea.  No headaches or body aches.  No GERD.  Increased thirst.  No change in appetite.  Wore a scopolamine patch while on cruise.    Using Listerine mouth rinse which is helping a lot.          Past Medical History:   Diagnosis Date     Complete or unspecified spontaneous  without complication     cytotec used for medical treatment     Encounter for full-term uncomplicated delivery     x 3     Noninflammatory disorder of uterus     No Comments Provided     Past Surgical History:   Procedure Laterality Date     LAPAROSCOPY DIAGNOSTIC (GENERAL)      dysmenorrhea     TUBAL LIGATION       Social History     Tobacco Use     Smoking status: Former Smoker     Last attempt to quit: 2009     Years since quitting: 10.8     Smokeless tobacco: Never Used   Substance Use Topics     Alcohol use: No     Alcohol/week: 0.0 standard drinks     Current Outpatient Medications   Medication Sig Dispense Refill     scopolamine (TRANSDERM) 1 MG/3DAYS 72 hr patch Place 1 patch onto the skin every 72 hours (Patient not taking: Reported on 2/10/2020) 6 patch 0     No Known Allergies      Past medical history, past surgical history, current medications and allergies reviewed and accurate to the best of my knowledge.        ROS:  Refer to HPI    /72   Pulse 76   Temp 97.7  F (36.5  C) (Tympanic)   Resp 16   Ht 1.625 m (5' 3.98\")   Wt 73.4 kg (161 lb 14.4 oz)   SpO2 98%   BMI 27.81 kg/m      EXAM:  General Appearance: Well appearing adult female, appropriate " appearance for age. No acute distress  Eyes: conjunctivae normal without erythema or irritation, corneas clear, no drainage or crusting, no eyelid swelling, pupils equal   Orophayrnx: moist mucous membranes, buccal mucosa without lesions or leukoplakia, hard palate with few scattered light erythematous oral ulcers/flat lesions, posterior pharynx with mild erythema, irritation, and few oral ulcer/flat lesions, tonsils without hypertrophy, no erythema, no exudates or petechiae, no post nasal drip seen, no trismus, voice clear, tongue without erythema, leukoplakia, or lesions.    Nose: no drainage or congestion   Neck: supple without adenopathy  Respiratory: normal chest wall and respirations.  Normal effort.  Clear to auscultation bilaterally, no wheezing, crackles or rhonchi.  No increased work of breathing.  No cough appreciated, oxygen saturation 98%  Cardiac: RRR with no murmurs  Musculoskeletal:  Equal movement of bilateral upper extremities.  Equal movement of bilateral lower extremities.  Normal gait.    Psychological: normal affect, alert, oriented, and pleasant.         ASSESSMENT/PLAN:  1. Oral mucosal lesion    Patient has a few small scattered flat ulcers in her oral cavity.  She was recently of a cruise ship to [place], [facility], and [place].  She states she ate lots of seafood and increased fruit juices.  She also wore a scopolamine patch.  The combination of dry mouth from the patch along with the different foods may have caused her some oral mucosal irritation.  She is non ill feeling and appearing with no fevers and no other signs/symptoms of illness.    Recommend she continue with oral rinses and follow up if no improvement.    Discussed warning signs/symptoms indicative of need to f/u    Follow up if symptoms persist or worsen or concerns      I explained my diagnostic considerations and recommendations to the patient, who voiced understanding and agreement with the treatment plan. All questions were  answered. We discussed potential side effects of any prescribed or recommended therapies, as well as expectations for response to treatments.    Disclaimer:  This note consists of words and symbols derived from keyboarding, dictation, or using voice recognition software. As a result, there may be errors in the script that have gone undetected. Please consider this when interpreting information found in this note.

## 2020-03-11 ENCOUNTER — HEALTH MAINTENANCE LETTER (OUTPATIENT)
Age: 35
End: 2020-03-11

## 2020-04-29 ENCOUNTER — TELEPHONE (OUTPATIENT)
Dept: TRANSPLANT | Facility: CLINIC | Age: 35
End: 2020-04-29

## 2020-04-29 DIAGNOSIS — Z00.5 TRANSPLANT DONOR EVALUATION: Primary | ICD-10-CM

## 2020-04-29 NOTE — TELEPHONE ENCOUNTER
"Close Window   Print This Page   Expand All  Collapse All  [facility] BREEZE  [id number]      LIVING KIDNEY DONOR EVALUATION  Donor First Name Megan Donor MRN    Donor Last Name Jhoan Completed 2020 4:13 PM    1985 Record ID [id number]   BREEZE Screen PASSED     Intended Recipient  Recipient First Name Triny Recipient MRN    Recipient Last Name Bobby Relationship Not related (Other)   Recipient   Recipient Diagnosis    Recipient's ABO      Donor Information  Age 34 Gender Female   Ht 165 cm (5' 5'') Race    Wt 76.2 kg (168 lbs) Ethnicity Not /   BMI 28.00 kg/m  Preferred Language English      Required No     Blood Type Unknown   Demographics  Home Address [address] [place] [address] # [phone]   [facility] Type [facility] #    Zip Code [address] Type    Country [place] Preferred Contact day Mon, Wed, Tue   Email [email or web address] Preferred Contact time 09:00 AM-11:00 AM   &&   Donor's Medical Information  Medical History Cervical Dysplasia   Dysfunctional Uterine Bleeding   History of    History of miscarriage   History of pregnancy Medications None Reported   Surgical History None Reported Allergies NKDA   Social History EtOH: Rare (1-2 drinks/month)   Illicit Drug Use: Denies   Tobacco: Remote; Quit ; (1/2 ppd x 5 years) Self-Reported Functional Status \"I am able to participate in strenuous sports such as swimming, singles tennis, football, basketball, or skiing\"   Family Medical History Cancer (denies)   Diabetes (Grandparent)   Heart Disease (Father)   Hypertension (denies)   Kidney Disease (denies)   Kidney Stones (denies) Exercise Frequency Exercise (Not on a regular basis)   Review of Organ Systems  Review of Systems Airway or Lungs: No   Blood Disorder: No   Cancer: No   Diabetes,Thyroid,Adrenal,Endocrine Disorder: No   Digestive or Liver: No   Female Health: Yes   Heart or Circulatory System: No "   Immune Diseases: No   Kidneys and Bladder: No   Muscles,Bones,Joints: No   Neuro: No   Psych: No   &&   Donor's Social Information  Marital Status  Living Accommodation Owns own home/apartment   Level of Education Some college education Living Arrangement With spouse   Employment Status Part Time Concerns: health and life insurance No   Employer [employer] Concerns: job security and lost income No   Occupation      Medical Insurance Status Has medical insurance     High Risk Behavior  High Risk Behaviors Blood transfusion < 12 months. (NO)   Commercial sex < 12 months. (NO)   Illicit IV drug use < 5yrs. (NO)   Other high risk sexual contact < 12 months. (NO)   Reason for Donation  Referral Tx Candidate Reason for Donation It s for my sons grandmother.   Permission to Disclose Inquiry Yes Patient Comments    Donor Motivation Level Highly motivated donor     PCP Contact  PCP Name Rhea Sampson   PCP [facility]   PCP Phone [phone]   Emergency Contact  First Name João First Name Sawyer   Last Name Jhoan Last Name Estuardo   Phone # [phone] Phone # [phone]   Phone Type Mobile Phone Type Mobile   Relationship Spouse Relationship Son   Office Use  Reviewed By    Reviewed 4/29/2020 11:49 AM   Admin Folder Archive   Comments    Lost for Followup    Extended Comments    BREEZE ID [id number] survey status completed   Activity History  Call  Task    Due Date 4/29/2020   Last Modified Date/Time 4/29/2020 10:39 AM   Comments

## 2020-06-10 ENCOUNTER — ALLIED HEALTH/NURSE VISIT (OUTPATIENT)
Dept: FAMILY MEDICINE | Facility: OTHER | Age: 35
End: 2020-06-10
Attending: TRANSPLANT SURGERY

## 2020-06-10 VITALS
SYSTOLIC BLOOD PRESSURE: 112 MMHG | BODY MASS INDEX: 27.82 KG/M2 | DIASTOLIC BLOOD PRESSURE: 80 MMHG | HEIGHT: 65 IN | WEIGHT: 167 LBS

## 2020-06-10 DIAGNOSIS — Z00.5 TRANSPLANT DONOR EVALUATION: ICD-10-CM

## 2020-06-10 DIAGNOSIS — Z00.5 EXAMINATION OF POTENTIAL DONOR OF ORGAN AND TISSUE: Primary | ICD-10-CM

## 2020-06-10 LAB
ABO + RH BLD: NORMAL
ABO + RH BLD: NORMAL
ALBUMIN MFR UR ELPH: 10 MG/DL (ref 1–14)
ALBUMIN UR-MCNC: NEGATIVE MG/DL
APPEARANCE UR: CLEAR
BACTERIA #/AREA URNS HPF: ABNORMAL /HPF
BILIRUB UR QL STRIP: NEGATIVE
COLOR UR AUTO: ABNORMAL
CREAT SERPL-MCNC: 0.68 MG/DL (ref 0.6–1.2)
CREAT UR-MCNC: 94 MG/DL
CREAT UR-MCNC: 99 MG/DL
GFR SERPL CREATININE-BSD FRML MDRD: >90 ML/MIN/{1.73_M2}
GLUCOSE SERPL-MCNC: 101 MG/DL (ref 70–105)
GLUCOSE UR STRIP-MCNC: NEGATIVE MG/DL
HGB BLD-MCNC: 12.7 G/DL (ref 11.7–15.7)
HGB UR QL STRIP: NEGATIVE
KETONES UR STRIP-MCNC: NEGATIVE MG/DL
LEUKOCYTE ESTERASE UR QL STRIP: ABNORMAL
MICROALBUMIN UR-MCNC: 6 MG/L
MICROALBUMIN/CREAT UR: 6.49 MG/G CR (ref 0–25)
MUCOUS THREADS #/AREA URNS LPF: PRESENT /LPF
NITRATE UR QL: NEGATIVE
PH UR STRIP: 7 PH (ref 5–7)
PROT/CREAT 24H UR: 0.11 MG/G{CREAT}
RBC #/AREA URNS AUTO: 3 /HPF (ref 0–2)
SOURCE: ABNORMAL
SP GR UR STRIP: 1.02 (ref 1–1.03)
SPECIMEN EXP DATE BLD: NORMAL
SQUAMOUS #/AREA URNS AUTO: 5 /HPF (ref 0–1)
UROBILINOGEN UR STRIP-MCNC: NORMAL MG/DL (ref 0–2)
WBC #/AREA URNS AUTO: 1 /HPF (ref 0–5)

## 2020-06-10 PROCEDURE — 86900 BLOOD TYPING SEROLOGIC ABO: CPT | Mod: ZL | Performed by: TRANSPLANT SURGERY

## 2020-06-10 PROCEDURE — 84156 ASSAY OF PROTEIN URINE: CPT | Mod: ZL | Performed by: TRANSPLANT SURGERY

## 2020-06-10 PROCEDURE — 82565 ASSAY OF CREATININE: CPT | Mod: ZL | Performed by: TRANSPLANT SURGERY

## 2020-06-10 PROCEDURE — 82043 UR ALBUMIN QUANTITATIVE: CPT | Mod: ZL | Performed by: TRANSPLANT SURGERY

## 2020-06-10 PROCEDURE — 81001 URINALYSIS AUTO W/SCOPE: CPT | Mod: ZL | Performed by: TRANSPLANT SURGERY

## 2020-06-10 PROCEDURE — 82947 ASSAY GLUCOSE BLOOD QUANT: CPT | Mod: ZL | Performed by: TRANSPLANT SURGERY

## 2020-06-10 PROCEDURE — 85018 HEMOGLOBIN: CPT | Mod: ZL | Performed by: TRANSPLANT SURGERY

## 2020-06-10 PROCEDURE — 36415 COLL VENOUS BLD VENIPUNCTURE: CPT | Mod: ZL | Performed by: TRANSPLANT SURGERY

## 2020-06-10 ASSESSMENT — MIFFLIN-ST. JEOR: SCORE: 1445.45

## 2020-06-10 NOTE — PROGRESS NOTES
Verified patient's first and last name, and . Patient stated reason for visit today is to receive height, weight, and BP readings. Needs (3) BP readings 15 minutes apart. Order for vital sign assessment given to nurse. Written order from [facility]. Vital sign assessment completed and faxed to [phone] as requested. Order sent to HIM to be scanned into EHR.        Kaylynn JAIMESN, RN on 6/10/2020 at 9:01 AM

## 2020-06-12 ENCOUNTER — TELEPHONE (OUTPATIENT)
Dept: TRANSPLANT | Facility: CLINIC | Age: 35
End: 2020-06-12

## 2020-06-12 ENCOUNTER — DOCUMENTATION ONLY (OUTPATIENT)
Dept: TRANSPLANT | Facility: CLINIC | Age: 35
End: 2020-06-12

## 2020-06-12 NOTE — TELEPHONE ENCOUNTER
Informed she's abo incompat.Wants to talk with recip re:Fan will let us know her decision. Knows recip currently working on things. Hgb lower and states no hx that she's aware of. UA showing rbc's and states may have been near time of menses so ? Cause of lower hgb.

## 2020-12-27 ENCOUNTER — HEALTH MAINTENANCE LETTER (OUTPATIENT)
Age: 35
End: 2020-12-27

## 2021-02-08 ENCOUNTER — MYC MEDICAL ADVICE (OUTPATIENT)
Dept: FAMILY MEDICINE | Facility: OTHER | Age: 36
End: 2021-02-08

## 2021-10-09 ENCOUNTER — HEALTH MAINTENANCE LETTER (OUTPATIENT)
Age: 36
End: 2021-10-09

## 2021-11-02 ENCOUNTER — OFFICE VISIT (OUTPATIENT)
Dept: FAMILY MEDICINE | Facility: OTHER | Age: 36
End: 2021-11-02
Attending: NURSE PRACTITIONER
Payer: COMMERCIAL

## 2021-11-02 VITALS
RESPIRATION RATE: 16 BRPM | SYSTOLIC BLOOD PRESSURE: 112 MMHG | HEART RATE: 82 BPM | OXYGEN SATURATION: 98 % | DIASTOLIC BLOOD PRESSURE: 72 MMHG | BODY MASS INDEX: 30.28 KG/M2 | WEIGHT: 179.2 LBS | TEMPERATURE: 97.1 F

## 2021-11-02 DIAGNOSIS — R30.0 DYSURIA: Primary | ICD-10-CM

## 2021-11-02 LAB
ALBUMIN UR-MCNC: NEGATIVE MG/DL
APPEARANCE UR: CLEAR
BACTERIA #/AREA URNS HPF: ABNORMAL /HPF
BILIRUB UR QL STRIP: NEGATIVE
COLOR UR AUTO: YELLOW
GLUCOSE UR STRIP-MCNC: NEGATIVE MG/DL
HGB UR QL STRIP: NEGATIVE
KETONES UR STRIP-MCNC: NEGATIVE MG/DL
LEUKOCYTE ESTERASE UR QL STRIP: NEGATIVE
MUCOUS THREADS #/AREA URNS LPF: PRESENT /LPF
NITRATE UR QL: NEGATIVE
PH UR STRIP: 7 [PH] (ref 5–9)
RBC URINE: 1 /HPF
SP GR UR STRIP: 1.01 (ref 1–1.03)
UROBILINOGEN UR STRIP-MCNC: NORMAL MG/DL
WBC URINE: 2 /HPF

## 2021-11-02 PROCEDURE — 99213 OFFICE O/P EST LOW 20 MIN: CPT | Performed by: NURSE PRACTITIONER

## 2021-11-02 PROCEDURE — 81001 URINALYSIS AUTO W/SCOPE: CPT | Mod: ZL | Performed by: NURSE PRACTITIONER

## 2021-11-02 ASSESSMENT — PAIN SCALES - GENERAL: PAINLEVEL: NO PAIN (0)

## 2021-11-03 NOTE — PROGRESS NOTES
ASSESSMENT/PLAN:  1. Dysuria    - UA with Microscopic reflex to Culture      UA results showed few bacteria and mucous.     Discussed with the patient that she does not appear to have a UTI and an antibiotic is not indicated at this time.     Instructed the patient to continue to increase her fluid intake and drinking cranberry juice. Also encouraged the patient to try taking OTC AZO PRN for dysuria.     May use over-the-counter Tylenol or ibuprofen PRN    Discussed warning signs/symptoms indicative of need to f/u    Follow up if symptoms persist or worsen or concerns      I explained my diagnostic considerations and recommendations to the patient, who voiced understanding and agreement with the treatment plan. All questions were answered. We discussed potential side effects of any prescribed or recommended therapies, as well as expectations for response to treatments.        HPI:    Megan Staples is a 35 year old female  who presents to [facility] today for dysuria. Symptoms started on Friday. She states that she has been drinking cranberry juice and water. Denies hematuria. Reports noticing some sediment in her urine. Also reports some urgency. States that she has had frequency when she increased her fluid intake. Denies taking any OTC medication for symptoms. Denies a significant history of UTI's Denies fevers. LMP was - and lasted 5 days. Denies any abnormal vaginal discharge or itching.     Past Medical History:   Diagnosis Date     Complete or unspecified spontaneous  without complication     cytotec used for medical treatment     Encounter for full-term uncomplicated delivery     x 3     Noninflammatory disorder of uterus     No Comments Provided     Past Surgical History:   Procedure Laterality Date     LAPAROSCOPY DIAGNOSTIC (GENERAL)      dysmenorrhea     TUBAL LIGATION  2015     Social History     Tobacco Use     Smoking status: Former Smoker     Quit date: 2009      Years since quittin.5     Smokeless tobacco: Never Used   Substance Use Topics     Alcohol use: No     Alcohol/week: 0.0 standard drinks     No current outpatient medications on file.     No Known Allergies      Past medical history, past surgical history, current medications and allergies reviewed and accurate to the best of my knowledge.        ROS:  Refer to HPI    /72   Pulse 82   Temp 97.1  F (36.2  C) (Temporal)   Resp 16   Wt 81.3 kg (179 lb 3.2 oz)   LMP 10/16/2021   SpO2 98%   Breastfeeding No   BMI 30.28 kg/m      EXAM:  General Appearance: Well appearing female, appropriate appearance for age. No acute distress  Abdomen: soft, nontender, no rigidity, no rebound tenderness or guarding, normal bowel sounds present  :  No suprapubic tenderness to palpation.  No CVA tenderness to palpation.    Psychological: normal affect, alert, oriented, and pleasant.       Labs:  Results for orders placed or performed in visit on 21   UA with Microscopic reflex to Culture     Status: Abnormal    Specimen: Urine, Midstream   Result Value Ref Range    Color Urine Yellow Colorless, Straw, Light Yellow, Yellow    Appearance Urine Clear Clear    Glucose Urine Negative Negative mg/dL    Bilirubin Urine Negative Negative    Ketones Urine Negative Negative mg/dL    Specific Gravity Urine 1.007 1.000 - 1.030    Blood Urine Negative Negative    pH Urine 7.0 5.0 - 9.0    Protein Albumin Urine Negative Negative mg/dL    Urobilinogen Urine Normal Normal, 2.0 mg/dL    Nitrite Urine Negative Negative    Leukocyte Esterase Urine Negative Negative    Bacteria Urine Few (A) None Seen /HPF    Mucus Urine Present (A) None Seen /LPF    RBC Urine 1 <=2 /HPF    WBC Urine 2 <=5 /HPF    Narrative    Urine Culture not indicated

## 2021-11-03 NOTE — NURSING NOTE
"Chief Complaint   Patient presents with     Dysuria     for 4 days   She has had dysuria for 4 days. She has tried drinking cranberry juice and pushing fluids and it has helped but the dysuria is still happening.  Penny Blanchard LPN..................11/2/2021   7:55 PM      Initial /72   Pulse 82   Temp 97.1  F (36.2  C) (Temporal)   Resp 16   Wt 81.3 kg (179 lb 3.2 oz)   LMP 10/16/2021   SpO2 98%   Breastfeeding No   BMI 30.28 kg/m   Estimated body mass index is 30.28 kg/m  as calculated from the following:    Height as of 6/10/20: 1.638 m (5' 4.5\").    Weight as of this encounter: 81.3 kg (179 lb 3.2 oz).  Medication Reconciliation: complete    FOOD SECURITY SCREENING QUESTIONS  Hunger Vital Signs:  Within the past 12 months we worried whether our food would run out before we got money to buy more. Never  Within the past 12 months the food we bought just didn't last and we didn't have money to get more. Never    Penny Blanchard, MADYSON  "

## 2021-12-29 ENCOUNTER — OFFICE VISIT (OUTPATIENT)
Dept: FAMILY MEDICINE | Facility: OTHER | Age: 36
End: 2021-12-29
Attending: NURSE PRACTITIONER
Payer: COMMERCIAL

## 2021-12-29 VITALS
HEART RATE: 94 BPM | OXYGEN SATURATION: 97 % | BODY MASS INDEX: 30.04 KG/M2 | TEMPERATURE: 98.7 F | SYSTOLIC BLOOD PRESSURE: 120 MMHG | HEIGHT: 65 IN | RESPIRATION RATE: 18 BRPM | WEIGHT: 180.3 LBS | DIASTOLIC BLOOD PRESSURE: 72 MMHG

## 2021-12-29 DIAGNOSIS — J06.9 VIRAL UPPER RESPIRATORY TRACT INFECTION: Primary | ICD-10-CM

## 2021-12-29 PROCEDURE — 99213 OFFICE O/P EST LOW 20 MIN: CPT | Performed by: NURSE PRACTITIONER

## 2021-12-29 ASSESSMENT — PAIN SCALES - GENERAL: PAINLEVEL: NO PAIN (0)

## 2021-12-29 ASSESSMENT — MIFFLIN-ST. JEOR: SCORE: 1495.78

## 2021-12-29 NOTE — PATIENT INSTRUCTIONS

## 2021-12-29 NOTE — PROGRESS NOTES
ASSESSMENT/PLAN:  1. Viral upper respiratory tract infection      Patient declines COVID testing during today's visit.     Discussed with the patient that due to her symptoms starting approximately 4 days ago she would not be a candidate for Tamiflu therefore influenza testing is not indicated during today's visit.     Discussed with patient that symptoms and exam are consistent with viral illness.  Discussed that symptomatic treatment of cough is appropriate but not with antibiotics.      Symptomatic treatment - Encouraged fluids, salt water gargles, honey, elevation, humidifier, lozenges, etc     May use over-the-counter Tylenol or ibuprofen PRN    Discussed warning signs/symptoms indicative of need to f/u    Follow up if symptoms persist or worsen or concerns      I explained my diagnostic considerations and recommendations to the patient, who voiced understanding and agreement with the treatment plan. All questions were answered. We discussed potential side effects of any prescribed or recommended therapies, as well as expectations for response to treatments.        HPI:    Megan Staples is a 36 year old female  who presents to [facility] today for non productive cough and chest congestion x 4 days, fatigue and low grade fever x 2 days. Patient reports having 3 negative home COVID tests. No known sick contacts. She has taken Ibuprofen and karyna seltzer for treatment. Highest temperature of 100.3 F. She also reports having nasal congestion and some body aches. Denies sore throat.      Past Medical History:   Diagnosis Date     Complete or unspecified spontaneous  without complication     cytotec used for medical treatment     Encounter for full-term uncomplicated delivery     x 3     Noninflammatory disorder of uterus     No Comments Provided     Past Surgical History:   Procedure Laterality Date     LAPAROSCOPY DIAGNOSTIC (GENERAL)      dysmenorrhea     TUBAL LIGATION       Social History  "    Tobacco Use     Smoking status: Former Smoker     Quit date: 2009     Years since quittin.7     Smokeless tobacco: Never Used   Substance Use Topics     Alcohol use: No     Alcohol/week: 0.0 standard drinks     No current outpatient medications on file.     No Known Allergies      Past medical history, past surgical history, current medications and allergies reviewed and accurate to the best of my knowledge.        ROS:  Refer to HPI    /72 (BP Location: Right arm, Patient Position: Sitting, Cuff Size: Adult Regular)   Pulse 94   Temp 98.7  F (37.1  C) (Tympanic)   Resp 18   Ht 1.638 m (5' 4.5\")   Wt 81.8 kg (180 lb 4.8 oz)   SpO2 97%   Breastfeeding No   BMI 30.47 kg/m      EXAM:  General Appearance: Well appearing female, appropriate appearance for age. No acute distress  Ears: Left TM intact, translucent with bony landmarks appreciated, no erythema, no effusion, no bulging, no purulence.  Right TM intact, translucent with bony landmarks appreciated, no erythema, no effusion, no bulging, no purulence.  Left auditory canal clear.  Right auditory canal clear.  Normal external ears, non tender.  Orophayrnx: moist mucous membranes, posterior pharynx without erythema, tonsils without hypertrophy, no erythema, no exudates or petechiae, no post nasal drip seen, no trismus, voice clear.    Nose:  Bilateral nares: no erythema, no edema, no drainage or congestion   Neck: supple with adenopathy of bilateral anterior cervical chains.   Respiratory: normal chest wall and respirations.  Normal effort.  Clear to auscultation bilaterally, no wheezing, crackles or rhonchi.  No increased work of breathing.  Cough appreciated.  Cardiac: RRR with no murmurs  Psychological: normal affect, alert, oriented, and pleasant.           "

## 2021-12-29 NOTE — NURSING NOTE
Patient presents to the clinic for cough, chest congestion x 4 days, fatigue and low grade fever x 2 days. Patient states she had 3 negative at home covid tests over the last 4 days. She has taken ibuprofen and karyna seltzer for treatment. Highest recorded temp was 100.3.         FOOD SECURITY SCREENING QUESTIONS  Hunger Vital Signs:  Within the past 12 months we worried whether our food would run out before we got money to buy more. Never  Within the past 12 months the food we bought just didn't last and we didn't have money to get more. Never  Medication Reconciliation: complete  Jessica Collins, CARTER 12/29/2021 10:11 AM

## 2022-01-29 ENCOUNTER — HEALTH MAINTENANCE LETTER (OUTPATIENT)
Age: 37
End: 2022-01-29

## 2022-09-08 ENCOUNTER — TELEPHONE (OUTPATIENT)
Dept: TRANSPLANT | Facility: CLINIC | Age: 37
End: 2022-09-08

## 2022-09-08 NOTE — TELEPHONE ENCOUNTER
LM asking if she's intersted yet in helping out friend/recip yet.Was going to talk with recip in 2020 re:PEP option.To let me know either way if still interested.

## 2022-09-17 ENCOUNTER — HEALTH MAINTENANCE LETTER (OUTPATIENT)
Age: 37
End: 2022-09-17

## 2022-09-29 ENCOUNTER — DOCUMENTATION ONLY (OUTPATIENT)
Dept: TRANSPLANT | Facility: CLINIC | Age: 37
End: 2022-09-29

## 2023-05-03 ENCOUNTER — OFFICE VISIT (OUTPATIENT)
Dept: FAMILY MEDICINE | Facility: OTHER | Age: 38
End: 2023-05-03
Attending: FAMILY MEDICINE
Payer: COMMERCIAL

## 2023-05-03 VITALS
TEMPERATURE: 97.7 F | OXYGEN SATURATION: 98 % | SYSTOLIC BLOOD PRESSURE: 122 MMHG | DIASTOLIC BLOOD PRESSURE: 60 MMHG | HEIGHT: 65 IN | HEART RATE: 79 BPM | WEIGHT: 179 LBS | BODY MASS INDEX: 29.82 KG/M2 | RESPIRATION RATE: 14 BRPM

## 2023-05-03 DIAGNOSIS — B07.9 VIRAL WARTS, UNSPECIFIED TYPE: ICD-10-CM

## 2023-05-03 DIAGNOSIS — N92.0 MENORRHAGIA WITH REGULAR CYCLE: ICD-10-CM

## 2023-05-03 DIAGNOSIS — J34.89 NOSE IRRITATION: ICD-10-CM

## 2023-05-03 DIAGNOSIS — L30.9 ECZEMA, UNSPECIFIED TYPE: ICD-10-CM

## 2023-05-03 DIAGNOSIS — Z00.00 HEALTH CARE MAINTENANCE: Primary | ICD-10-CM

## 2023-05-03 LAB
ERYTHROCYTE [DISTWIDTH] IN BLOOD BY AUTOMATED COUNT: 15.7 % (ref 10–15)
FERRITIN SERPL-MCNC: 9 NG/ML (ref 6–175)
HCT VFR BLD AUTO: 37 % (ref 35–47)
HGB BLD-MCNC: 11.7 G/DL (ref 11.7–15.7)
MCH RBC QN AUTO: 25.1 PG (ref 26.5–33)
MCHC RBC AUTO-ENTMCNC: 31.6 G/DL (ref 31.5–36.5)
MCV RBC AUTO: 79 FL (ref 78–100)
PLATELET # BLD AUTO: 287 10E3/UL (ref 150–450)
PROLACTIN SERPL 3RD IS-MCNC: 9 NG/ML (ref 5–23)
RBC # BLD AUTO: 4.67 10E6/UL (ref 3.8–5.2)
TSH SERPL DL<=0.005 MIU/L-ACNC: 0.7 UIU/ML (ref 0.3–4.2)
WBC # BLD AUTO: 5.2 10E3/UL (ref 4–11)

## 2023-05-03 PROCEDURE — 87624 HPV HI-RISK TYP POOLED RSLT: CPT | Mod: ZL | Performed by: FAMILY MEDICINE

## 2023-05-03 PROCEDURE — 85027 COMPLETE CBC AUTOMATED: CPT | Mod: ZL | Performed by: FAMILY MEDICINE

## 2023-05-03 PROCEDURE — G0123 SCREEN CERV/VAG THIN LAYER: HCPCS | Performed by: FAMILY MEDICINE

## 2023-05-03 PROCEDURE — 84146 ASSAY OF PROLACTIN: CPT | Mod: ZL | Performed by: FAMILY MEDICINE

## 2023-05-03 PROCEDURE — 99395 PREV VISIT EST AGE 18-39: CPT | Performed by: FAMILY MEDICINE

## 2023-05-03 PROCEDURE — 84443 ASSAY THYROID STIM HORMONE: CPT | Mod: ZL | Performed by: FAMILY MEDICINE

## 2023-05-03 PROCEDURE — 82728 ASSAY OF FERRITIN: CPT | Mod: ZL | Performed by: FAMILY MEDICINE

## 2023-05-03 PROCEDURE — 36415 COLL VENOUS BLD VENIPUNCTURE: CPT | Mod: ZL | Performed by: FAMILY MEDICINE

## 2023-05-03 RX ORDER — TRIAMCINOLONE ACETONIDE 5 MG/G
OINTMENT TOPICAL
Qty: 15 G | Refills: 1 | Status: SHIPPED | OUTPATIENT
Start: 2023-05-03 | End: 2024-04-02

## 2023-05-03 RX ORDER — MUPIROCIN 20 MG/G
OINTMENT TOPICAL 3 TIMES DAILY
Qty: 15 G | Refills: 1 | Status: SHIPPED | OUTPATIENT
Start: 2023-05-03 | End: 2024-04-02

## 2023-05-03 ASSESSMENT — ENCOUNTER SYMPTOMS
PALPITATIONS: 0
ABDOMINAL PAIN: 0
HEMATURIA: 0
CHILLS: 0
JOINT SWELLING: 0
DYSURIA: 0
BREAST MASS: 0
HEADACHES: 0
FREQUENCY: 0
EYE PAIN: 0
NAUSEA: 0
DIZZINESS: 0
HEARTBURN: 0
ARTHRALGIAS: 0
SORE THROAT: 0
FEVER: 0
COUGH: 0
CONSTIPATION: 0
WEAKNESS: 0
HEMATOCHEZIA: 0
MYALGIAS: 0
SHORTNESS OF BREATH: 0
NERVOUS/ANXIOUS: 0
DIARRHEA: 0
PARESTHESIAS: 0

## 2023-05-03 ASSESSMENT — PAIN SCALES - GENERAL: PAINLEVEL: NO PAIN (0)

## 2023-05-03 NOTE — NURSING NOTE
Chief Complaint   Patient presents with     Physical         Medication Reconciliation: roberto Hilario

## 2023-05-03 NOTE — PROGRESS NOTES
SUBJECTIVE:   CC: Megan is an 37 year old who presents for preventive health visit.       5/3/2023    10:20 AM   Additional Questions   Roomed by Jane TANNER   Accompanied by Self       Healthy Habits:     Getting at least 3 servings of Calcium per day:  NO    Bi-annual eye exam:  NO    Dental care twice a year:  Yes    Sleep apnea or symptoms of sleep apnea:  None    Diet:  Regular (no restrictions)    Frequency of exercise:  2-3 days/week    Duration of exercise:  30-45 minutes    Taking medications regularly:  Yes    Medication side effects:  Not applicable    PHQ-2 Total Score: 0    Additional concerns today:  Yes    Periods still regular, but much heavier than previous.   Day 2-3 are very heavy, total duration about 5 days.   Cramping typically manageable. Did have more cramping with her last cycle.   Turned away from donating blood last 2 months due to low hgb. Has started a MVI.       Contraception: tubal  Risk for STI?: no  Last pap: 2017 with neg HPV  Any hx of abnormal paps:  Yes, see problem list  FH of early CA?: no  Tobacco?: no  Calcium intake: yes  DEXA:  not indicated   Last mammo:  not indicated   Colonoscopy:  not indicated          Today's PHQ-2 Score:       5/3/2023     8:19 AM   PHQ-2 (  [employer])   Q1: Little interest or pleasure in doing things 0   Q2: Feeling down, depressed or hopeless 0   PHQ-2 Score 0   Q1: Little interest or pleasure in doing things Not at all    Not at all   Q2: Feeling down, depressed or hopeless Not at all    Not at all   PHQ-2 Score 0    0       Have you ever done Advance Care Planning? (For example, a Health Directive, POLST, or a discussion with a medical provider or your loved ones about your wishes): not discussed.     Social History     Tobacco Use     Smoking status: Former     Types: Cigarettes     Quit date: 2009     Years since quittin.0     Smokeless tobacco: Never   Vaping Use     Vaping status: Never Used   Substance Use Topics     Alcohol  "use: No     Alcohol/week: 0.0 standard drinks of alcohol             5/3/2023     8:19 AM   Alcohol Use   Prescreen: >3 drinks/day or >7 drinks/week? No     Reviewed orders with patient.  Reviewed health maintenance and updated orders accordingly - Yes      Breast Cancer Screenin/3/2023     8:19 AM   Breast CA Risk Assessment (FHS-7)   Do you have a family history of breast, colon, or ovarian cancer? No / Unknown       Pertinent mammograms are reviewed under the imaging tab.    History of abnormal Pap smear: yes, but now back to routine screening. Due for pap smear.      Reviewed and updated as needed this visit by clinical staff   Tobacco  Allergies  Meds              Reviewed and updated as needed this visit by Provider                   Review of Systems   Constitutional: Negative for chills and fever.   HENT: Negative for congestion, ear pain, hearing loss and sore throat.    Eyes: Negative for pain and visual disturbance.   Respiratory: Negative for cough and shortness of breath.    Cardiovascular: Negative for chest pain, palpitations and peripheral edema.   Gastrointestinal: Negative for abdominal pain, constipation, diarrhea, heartburn, hematochezia and nausea.   Breasts:  Negative for tenderness, breast mass and discharge.   Genitourinary: Negative for dysuria, frequency, genital sores, hematuria, pelvic pain, urgency, vaginal bleeding and vaginal discharge.   Musculoskeletal: Negative for arthralgias, joint swelling and myalgias.   Skin: Positive for rash.   Neurological: Negative for dizziness, weakness, headaches and paresthesias.   Psychiatric/Behavioral: Negative for mood changes. The patient is not nervous/anxious.         OBJECTIVE:   /60   Pulse 79   Temp 97.7  F (36.5  C) (Tympanic)   Resp 14   Ht 1.638 m (5' 4.5\")   Wt 81.2 kg (179 lb)   LMP 2023 (Exact Date)   SpO2 98%   BMI 30.25 kg/m    Physical Exam  Constitutional:       Appearance: She is well-developed. "   Eyes:      Conjunctiva/sclera: Conjunctivae normal.   Neck:      Thyroid: No thyromegaly.   Cardiovascular:      Rate and Rhythm: Normal rate and regular rhythm.      Heart sounds: Normal heart sounds. No murmur heard.  Pulmonary:      Effort: Pulmonary effort is normal. No respiratory distress.      Breath sounds: Normal breath sounds.   Abdominal:      Palpations: Abdomen is soft.   Genitourinary:     Comments: Pelvic exam: normal vagina and vulva, normal cervix without lesions or tenderness, pap smear done.    Lymphadenopathy:      Cervical: No cervical adenopathy.   Skin:     Findings: No rash.      Comments: Mild scaling irritation of the left ring finger associated with patient's ring, suspect moisture is getting trapped in this area..  Irritation with small pustules at the nares bilaterally.   Neurological:      Mental Status: She is alert and oriented to person, place, and time.         Diagnostic Test Results:  Labs reviewed in Epic    ASSESSMENT/PLAN:       ICD-10-CM    1. Health care maintenance  Z00.00 Pap Screen with HPV - recommended age 30 - 65 years      2. Viral warts, unspecified type  B07.9       3. Nose irritation  J34.89 mupirocin (BACTROBAN) 2 % external ointment     triamcinolone (KENALOG) 0.5 % external ointment      4. Eczema, unspecified type  L30.9 triamcinolone (KENALOG) 0.5 % external ointment      5. Menorrhagia with regular cycle  N92.0 US Pelvic Complete with Transvaginal     CBC W PLT No Diff     TSH Reflex GH     Prolactin     Ferritin     Ob/Gyn Referral     CBC W PLT No Diff     TSH Reflex GH     Prolactin     Ferritin        Lab work today.  Reviewed last lipid profile which was excellent in 2019, so not updated today.  We will plan pelvic ultrasound and OB/GYN consult to discuss options for management of menorrhagia.    Kenalog ointment to left finger irritation as needed.    Bactroban and occasional Kenalog to irritation of nares as needed.    Reviewed current recommendations  "regarding cervical cancer screening, Pap smear done today.        COUNSELING:  Reviewed preventive health counseling, as reflected in patient instructions      BMI:   Estimated body mass index is 30.25 kg/m  as calculated from the following:    Height as of this encounter: 1.638 m (5' 4.5\").    Weight as of this encounter: 81.2 kg (179 lb).   Weight management plan: Discussed healthy diet and exercise guidelines      She reports that she quit smoking about 14 years ago. She has never used smokeless tobacco.      Rhea Sampson MD  [facility]  "

## 2023-05-10 LAB
HUMAN PAPILLOMA VIRUS 16 DNA: NEGATIVE
HUMAN PAPILLOMA VIRUS 18 DNA: NEGATIVE
HUMAN PAPILLOMA VIRUS FINAL DIAGNOSIS: NORMAL
HUMAN PAPILLOMA VIRUS OTHER HR: NEGATIVE

## 2023-05-11 LAB
BKR LAB AP GYN ADEQUACY: NORMAL
BKR LAB AP GYN INTERPRETATION: NORMAL
BKR LAB AP HPV REFLEX: NORMAL
BKR LAB AP LMP: NORMAL
BKR LAB AP PREVIOUS ABNORMAL: NORMAL
PATH REPORT.COMMENTS IMP SPEC: NORMAL
PATH REPORT.COMMENTS IMP SPEC: NORMAL
PATH REPORT.RELEVANT HX SPEC: NORMAL

## 2023-05-31 ENCOUNTER — OFFICE VISIT (OUTPATIENT)
Dept: OBGYN | Facility: OTHER | Age: 38
End: 2023-05-31
Attending: FAMILY MEDICINE
Payer: COMMERCIAL

## 2023-05-31 ENCOUNTER — HOSPITAL ENCOUNTER (OUTPATIENT)
Dept: ULTRASOUND IMAGING | Facility: OTHER | Age: 38
Discharge: HOME | End: 2023-05-31
Attending: FAMILY MEDICINE
Payer: COMMERCIAL

## 2023-05-31 VITALS
DIASTOLIC BLOOD PRESSURE: 72 MMHG | BODY MASS INDEX: 29.76 KG/M2 | RESPIRATION RATE: 14 BRPM | SYSTOLIC BLOOD PRESSURE: 114 MMHG | HEIGHT: 64 IN | OXYGEN SATURATION: 99 % | WEIGHT: 174.3 LBS | HEART RATE: 83 BPM

## 2023-05-31 DIAGNOSIS — N94.6 DYSMENORRHEA: ICD-10-CM

## 2023-05-31 DIAGNOSIS — N92.0 MENORRHAGIA WITH REGULAR CYCLE: Primary | ICD-10-CM

## 2023-05-31 DIAGNOSIS — N92.0 MENORRHAGIA WITH REGULAR CYCLE: ICD-10-CM

## 2023-05-31 PROCEDURE — 99204 OFFICE O/P NEW MOD 45 MIN: CPT | Performed by: OBSTETRICS & GYNECOLOGY

## 2023-05-31 PROCEDURE — 76830 TRANSVAGINAL US NON-OB: CPT

## 2023-05-31 ASSESSMENT — PAIN SCALES - GENERAL: PAINLEVEL: NO PAIN (0)

## 2023-05-31 NOTE — NURSING NOTE
"Chief Complaint   Patient presents with     Consult     Menorrhagia with regular cycle         Initial /72 (BP Location: Right arm, Patient Position: Sitting, Cuff Size: Adult Regular)   Pulse 83   Resp 14   Ht 1.626 m (5' 4\")   Wt 79.1 kg (174 lb 4.8 oz)   LMP 05/24/2023 (Exact Date)   SpO2 99%   BMI 29.92 kg/m   Estimated body mass index is 29.92 kg/m  as calculated from the following:    Height as of this encounter: 1.626 m (5' 4\").    Weight as of this encounter: 79.1 kg (174 lb 4.8 oz).       Medication Reconciliation: Complete    Kendra Toro LPN .......  5/31/2023  10:36 AM   "

## 2023-05-31 NOTE — PROGRESS NOTES
Gynecology Visit    CC: heavy menses    HPI:    Megan Staples is a 37 year old , here for the above concern. She reports very painful periods as a teenager that improved after having kids. She had a laparoscopy that was negative for endometriosis.  However, the last 2-3 years, her periods have been getting progressively heavier and more painful again. Her periods are monthly, lasting 5-6 days with days 2-3 of heavy flow. She uses tampons, changing hourly on her heavy days, with pads for backup. She will pass clots and needs to get up twice at night. Her cramping starts the day before but is the worst on her two heavy days. No intermenstrual bleeding except will have post-coital bleeding during the week before her period. No pain with intercourse. She uses tubal ligation for contraception.     OBHx  OB History    Para Term  AB Living   4 3 3 0 1 3   SAB IAB Ectopic Multiple Live Births   1 0 0 0 3      # Outcome Date GA Lbr Wilner/2nd Weight Sex Delivery Anes PTL Lv   4 Term 11/26/15 38w1d   F Vag-Spont   SOCORRO      Apgar1: 7  Apgar5: 9   3 SAB 14              Birth Comments: treated with cytotec   2 Term 03/09/10 40w0d  3.856 kg (8 lb 8 oz) F Vag-Spont   SOCORRO      Birth Comments: Induced, pushed x 30 min      Name: Mary   1 Term 02 42w0d  4.182 kg (9 lb 3.5 oz) M Vag-Spont   SOCORRO      Birth Comments: Induced, pushed x 20 min      Name: kelley       PMHx:   Past Medical History:   Diagnosis Date     Complete or unspecified spontaneous  without complication     cytotec used for medical treatment     Encounter for full-term uncomplicated delivery     x 3     Noninflammatory disorder of uterus     No Comments Provided      PSHx:   Past Surgical History:   Procedure Laterality Date     LAPAROSCOPY DIAGNOSTIC (GENERAL)      dysmenorrhea     TUBAL LIGATION        Meds:   Current Outpatient Medications   Medication     mupirocin (BACTROBAN) 2 % external ointment      "triamcinolone (KENALOG) 0.5 % external ointment     No current facility-administered medications for this visit.      Allergies:   No Known Allergies    SocHx:   Social History     Tobacco Use     Smoking status: Former     Types: Cigarettes     Quit date: 2009     Years since quittin.1     Smokeless tobacco: Never   Vaping Use     Vaping status: Never Used   Substance Use Topics     Alcohol use: Yes     Alcohol/week: 1.0 standard drink of alcohol     Types: 1 Standard drinks or equivalent per week     Drug use: Never     Comment: Drug use: No     Lives with her  and 2 younger kids. Works as an     FamHx:   Family History   Problem Relation Age of Onset     Family History Negative Mother         Good Health     Heart Disease Father         Heart Disease,Atrial fibrillation     No Known Problems Sister      Diabetes Maternal Grandmother      Allergy (Severe) Daughter         Allergies      Physical Exam  /72 (BP Location: Right arm, Patient Position: Sitting, Cuff Size: Adult Regular)   Pulse 83   Resp 14   Ht 1.626 m (5' 4\")   Wt 79.1 kg (174 lb 4.8 oz)   LMP 2023 (Exact Date)   SpO2 99%   BMI 29.92 kg/m    Gen: Well-appearing, NAD  Resp: nonlabored  Psych: appropriate mood and affect    Pelvic US:  FINDINGS:   UTERUS: The uterus is normal in size and contour. The endometrium is  normal in thickness. Multiple nabothian cysts are seen.  ADNEXA: The ovaries are normal in size. There is no adnexal mass.  MISC: There is no free fluid in the pelvis.                                                          IMPRESSION: Essentially unremarkable pelvic ultrasound.       Assessment/Plan  Megan Staples is a 37 year old  female here for heavy menses and dysmenorrhea. Reviewed ultrasound images- myometrium somewhat heterogenous raising the possibility of adenomyosis. Reviewed all management options- including OCPs, Depo, Mirena, ablation and hysterectomy. " Reviewed expectant bleeding profiles of each, impact of dysmenorrhea, surgical expectations and postoperative restrictions. She is leaning toward IUD placement but wants to think about it more. Will call when she is ready to schedule    Total time spent 45 minutes in pre-visit planning, counseling, face-to-face exam, and documentation     Xochilt Alegre MD  OB/GYN  5/31/2023 1:01 PM

## 2024-03-22 ENCOUNTER — OFFICE VISIT (OUTPATIENT)
Dept: FAMILY MEDICINE | Facility: OTHER | Age: 39
End: 2024-03-22
Attending: NURSE PRACTITIONER
Payer: COMMERCIAL

## 2024-03-22 ENCOUNTER — MYC MEDICAL ADVICE (OUTPATIENT)
Dept: FAMILY MEDICINE | Facility: OTHER | Age: 39
End: 2024-03-22
Payer: COMMERCIAL

## 2024-03-22 VITALS
OXYGEN SATURATION: 100 % | TEMPERATURE: 98 F | HEIGHT: 65 IN | HEART RATE: 73 BPM | RESPIRATION RATE: 17 BRPM | DIASTOLIC BLOOD PRESSURE: 72 MMHG | BODY MASS INDEX: 30.97 KG/M2 | WEIGHT: 185.9 LBS | SYSTOLIC BLOOD PRESSURE: 112 MMHG

## 2024-03-22 DIAGNOSIS — M25.50 ARTHRALGIA OF MULTIPLE JOINTS: ICD-10-CM

## 2024-03-22 DIAGNOSIS — M25.561 ACUTE PAIN OF RIGHT KNEE: ICD-10-CM

## 2024-03-22 DIAGNOSIS — M25.562 ACUTE PAIN OF LEFT KNEE: ICD-10-CM

## 2024-03-22 DIAGNOSIS — R21 RASH: Primary | ICD-10-CM

## 2024-03-22 LAB
ANION GAP SERPL CALCULATED.3IONS-SCNC: 9 MMOL/L (ref 7–15)
BASOPHILS # BLD AUTO: 0 10E3/UL (ref 0–0.2)
BASOPHILS NFR BLD AUTO: 1 %
BUN SERPL-MCNC: 17.3 MG/DL (ref 6–20)
CALCIUM SERPL-MCNC: 9.5 MG/DL (ref 8.6–10)
CHLORIDE SERPL-SCNC: 103 MMOL/L (ref 98–107)
CREAT SERPL-MCNC: 0.64 MG/DL (ref 0.51–0.95)
CRP SERPL-MCNC: 6.88 MG/L
DEPRECATED HCO3 PLAS-SCNC: 25 MMOL/L (ref 22–29)
EGFRCR SERPLBLD CKD-EPI 2021: >90 ML/MIN/1.73M2
EOSINOPHIL # BLD AUTO: 0.2 10E3/UL (ref 0–0.7)
EOSINOPHIL NFR BLD AUTO: 3 %
ERYTHROCYTE [DISTWIDTH] IN BLOOD BY AUTOMATED COUNT: 14.9 % (ref 10–15)
ERYTHROCYTE [SEDIMENTATION RATE] IN BLOOD BY WESTERGREN METHOD: 11 MM/HR (ref 0–20)
GLUCOSE SERPL-MCNC: 100 MG/DL (ref 70–99)
GROUP A STREP BY PCR: NOT DETECTED
HCT VFR BLD AUTO: 34.6 % (ref 35–47)
HGB BLD-MCNC: 11 G/DL (ref 11.7–15.7)
IMM GRANULOCYTES # BLD: 0.1 10E3/UL
IMM GRANULOCYTES NFR BLD: 2 %
LYMPHOCYTES # BLD AUTO: 0.6 10E3/UL (ref 0.8–5.3)
LYMPHOCYTES NFR BLD AUTO: 13 %
MCH RBC QN AUTO: 24.7 PG (ref 26.5–33)
MCHC RBC AUTO-ENTMCNC: 31.8 G/DL (ref 31.5–36.5)
MCV RBC AUTO: 78 FL (ref 78–100)
MONOCYTES # BLD AUTO: 0.2 10E3/UL (ref 0–1.3)
MONOCYTES NFR BLD AUTO: 5 %
NEUTROPHILS # BLD AUTO: 3.5 10E3/UL (ref 1.6–8.3)
NEUTROPHILS NFR BLD AUTO: 76 %
NRBC # BLD AUTO: 0 10E3/UL
NRBC BLD AUTO-RTO: 0 /100
PLATELET # BLD AUTO: 313 10E3/UL (ref 150–450)
POTASSIUM SERPL-SCNC: 4.1 MMOL/L (ref 3.4–5.3)
RBC # BLD AUTO: 4.45 10E6/UL (ref 3.8–5.2)
SODIUM SERPL-SCNC: 137 MMOL/L (ref 135–145)
WBC # BLD AUTO: 4.6 10E3/UL (ref 4–11)

## 2024-03-22 PROCEDURE — 86060 ANTISTREPTOLYSIN O TITER: CPT | Mod: ZL | Performed by: STUDENT IN AN ORGANIZED HEALTH CARE EDUCATION/TRAINING PROGRAM

## 2024-03-22 PROCEDURE — 85004 AUTOMATED DIFF WBC COUNT: CPT | Mod: ZL | Performed by: STUDENT IN AN ORGANIZED HEALTH CARE EDUCATION/TRAINING PROGRAM

## 2024-03-22 PROCEDURE — 86618 LYME DISEASE ANTIBODY: CPT | Mod: ZL | Performed by: STUDENT IN AN ORGANIZED HEALTH CARE EDUCATION/TRAINING PROGRAM

## 2024-03-22 PROCEDURE — 85652 RBC SED RATE AUTOMATED: CPT | Mod: ZL | Performed by: STUDENT IN AN ORGANIZED HEALTH CARE EDUCATION/TRAINING PROGRAM

## 2024-03-22 PROCEDURE — 99213 OFFICE O/P EST LOW 20 MIN: CPT | Performed by: STUDENT IN AN ORGANIZED HEALTH CARE EDUCATION/TRAINING PROGRAM

## 2024-03-22 PROCEDURE — 87651 STREP A DNA AMP PROBE: CPT | Mod: ZL | Performed by: STUDENT IN AN ORGANIZED HEALTH CARE EDUCATION/TRAINING PROGRAM

## 2024-03-22 PROCEDURE — 36415 COLL VENOUS BLD VENIPUNCTURE: CPT | Mod: ZL | Performed by: STUDENT IN AN ORGANIZED HEALTH CARE EDUCATION/TRAINING PROGRAM

## 2024-03-22 PROCEDURE — 86140 C-REACTIVE PROTEIN: CPT | Mod: ZL | Performed by: STUDENT IN AN ORGANIZED HEALTH CARE EDUCATION/TRAINING PROGRAM

## 2024-03-22 PROCEDURE — 86617 LYME DISEASE ANTIBODY: CPT | Mod: ZL | Performed by: STUDENT IN AN ORGANIZED HEALTH CARE EDUCATION/TRAINING PROGRAM

## 2024-03-22 PROCEDURE — 80048 BASIC METABOLIC PNL TOTAL CA: CPT | Mod: ZL | Performed by: STUDENT IN AN ORGANIZED HEALTH CARE EDUCATION/TRAINING PROGRAM

## 2024-03-22 ASSESSMENT — PAIN SCALES - GENERAL: PAINLEVEL: MODERATE PAIN (4)

## 2024-03-22 NOTE — PROGRESS NOTES
Assessment & Plan     (R21) Rash  (primary encounter diagnosis)    Comment: Rash, bilateral knee swelling and pain.  Uncertain of exact cause of symptoms.  Consider viral illness versus inflammatory disease versus bacterial infection.  Lab work was completed today, no elevation in white blood cell count, hemoglobin stable, lymphocytes very slightly decreased.  BMP without any electrolyte derangements, kidney function within normal limits.  CRP elevated at 6.88, ESR within normal limits.  Strep throat test was negative.  Lyme screen as well as streptolysin is pending.    Plan: CBC and Differential, Basic Metabolic Panel,         CRP inflammation, Group A Streptococcus PCR         Throat Swab, Streptolysin O Antibody (ASO),         Lyme Disease Total Abs Bld with Reflex to         Confirm CLIA, Sedimentation Rate (ESR)          At this time, reassuring.  Treat rash and pain with over-the-counter medications including medication such as Tylenol, ibuprofen, Benadryl, Zyrtec.  Follow-up with primary care for persisting symptoms.  Return to rapid clinic or ER for worsening or changing symptoms.  She is comfortable and agreeable with this plan.    (M25.562) Acute pain of left knee    Comment: Pain of left and right knee, bilateral simultaneous pain.  Consider viral illness, transient symptoms.    Plan: CBC and Differential, Basic Metabolic Panel,         CRP inflammation, Group A Streptococcus PCR         Throat Swab, Streptolysin O Antibody (ASO),         Lyme Disease Total Abs Bld with Reflex to         Confirm CLIA, Sedimentation Rate (ESR)            (M25.561) Acute pain of right knee  Comment: See above.  Plan: CBC and Differential, Basic Metabolic Panel,         CRP inflammation, Group A Streptococcus PCR         Throat Swab, Streptolysin O Antibody (ASO),         Lyme Disease Total Abs Bld with Reflex to         Confirm CLIA, Sedimentation Rate (ESR)                Heide   Crystal is a 38 year old, presenting  "for the following health issues:  Derm Problem (Rash and warmth on arms and legs, noticed yesterday morning) and Knee Problem (Bilateral knee swelling)    HPI     Patient presents today with concerns of neck tension x 1 week ago for which she visited the chiropractor and he noted a mild lymph node near the scalp line.  She notes that that has since resolved but she has developed this full body rash, both arms and then the thighs.  It is worse with her showers and hot water.  Better throughout the day.  It does come and go.  It has been flat and not bumpy.  Yesterday into today she developed some bilateral knee pain.  States both knees have started to bother her at the same time.  She notes that it feels worse when she is walking on them as they \"feel boggy\".  She did take ibuprofen which does help.        She notes no recent tick bites.  No changes to her exercise routine.  No new medications or foods.      Review of Systems  She notes that her daughter has been sick recently but she has had no other symptoms of sickness such as sore throat, cough, congestion, or fevers.            Objective    /72 (BP Location: Right arm, Patient Position: Sitting, Cuff Size: Adult Regular)   Pulse 73   Temp 98  F (36.7  C) (Temporal)   Resp 17   Ht 1.651 m (5' 5\")   Wt 84.3 kg (185 lb 14.4 oz)   LMP 02/27/2024 (Approximate)   SpO2 100%   Breastfeeding No   BMI 30.94 kg/m    Body mass index is 30.94 kg/m .    Physical Exam   GENERAL: alert and no distress  EYES: Eyes grossly normal to inspection, PERRL and conjunctivae and sclerae normal  HENT: ear canals and TM's normal, nose and mouth without ulcers or lesions  NECK: one pea sized posterior cervical lymph node near scalp line, non-tender, no redness or warmth, no asymmetry, masses, or scars  RESP: lungs clear to auscultation - no rales, rhonchi or wheezes  CV: regular rate and rhythm, normal S1 S2  ABDOMEN: soft, nontender, no hepatosplenomegaly, no masses and " bowel sounds normal  MS: no gross musculoskeletal defects noted, no edema, erythema, ecchymosis of the knees, no tenderness to palpation, full range of motion, pain with ambulation bilaterally  SKIN: Diffuse macular rash across arms with delineated areas between the rash and then on rash, no raised, tender, bleeding, or scabbing areas, similar diffuse rash across the thighs  NEURO: Normal strength and tone, mentation intact and speech normal  PSYCH: mentation appears normal, affect normal/bright    Results for orders placed or performed in visit on 03/22/24   Basic Metabolic Panel     Status: Abnormal   Result Value Ref Range    Sodium 137 135 - 145 mmol/L    Potassium 4.1 3.4 - 5.3 mmol/L    Chloride 103 98 - 107 mmol/L    Carbon Dioxide (CO2) 25 22 - 29 mmol/L    Anion Gap 9 7 - 15 mmol/L    Urea Nitrogen 17.3 6.0 - 20.0 mg/dL    Creatinine 0.64 0.51 - 0.95 mg/dL    GFR Estimate >90 >60 mL/min/1.73m2    Calcium 9.5 8.6 - 10.0 mg/dL    Glucose 100 (H) 70 - 99 mg/dL   CRP inflammation     Status: Abnormal   Result Value Ref Range    CRP Inflammation 6.88 (H) <5.00 mg/L   Sedimentation Rate (ESR)     Status: Normal   Result Value Ref Range    Erythrocyte Sedimentation Rate 11 0 - 20 mm/hr   CBC with platelets and differential     Status: Abnormal   Result Value Ref Range    WBC Count 4.6 4.0 - 11.0 10e3/uL    RBC Count 4.45 3.80 - 5.20 10e6/uL    Hemoglobin 11.0 (L) 11.7 - 15.7 g/dL    Hematocrit 34.6 (L) 35.0 - 47.0 %    MCV 78 78 - 100 fL    MCH 24.7 (L) 26.5 - 33.0 pg    MCHC 31.8 31.5 - 36.5 g/dL    RDW 14.9 10.0 - 15.0 %    Platelet Count 313 150 - 450 10e3/uL    % Neutrophils 76 %    % Lymphocytes 13 %    % Monocytes 5 %    % Eosinophils 3 %    % Basophils 1 %    % Immature Granulocytes 2 %    NRBCs per 100 WBC 0 <1 /100    Absolute Neutrophils 3.5 1.6 - 8.3 10e3/uL    Absolute Lymphocytes 0.6 (L) 0.8 - 5.3 10e3/uL    Absolute Monocytes 0.2 0.0 - 1.3 10e3/uL    Absolute Eosinophils 0.2 0.0 - 0.7 10e3/uL     Absolute Basophils 0.0 0.0 - 0.2 10e3/uL    Absolute Immature Granulocytes 0.1 <=0.4 10e3/uL    Absolute NRBCs 0.0 10e3/uL   Group A Streptococcus PCR Throat Swab     Status: Normal    Specimen: Throat; Swab   Result Value Ref Range    Group A strep by PCR Not Detected Not Detected    Narrative    The Xpert Xpress Strep A test, performed on the [employer], is a rapid, qualitative in vitro diagnostic test for the detection of Streptococcus pyogenes (Group A ß-hemolytic Streptococcus, Strep A) in throat swab specimens from patients with signs and symptoms of pharyngitis. The Xpert Xpress Strep A test can be used as an aid in the diagnosis of Group A Streptococcal pharyngitis. The assay is not intended to monitor treatment for Group A Streptococcus infections. The Xpert Xpress Strep A test utilizes an automated real-time polymerase chain reaction (PCR) to detect Streptococcus pyogenes DNA.   CBC and Differential     Status: Abnormal    Narrative    The following orders were created for panel order CBC and Differential.  Procedure                               Abnormality         Status                     ---------                               -----------         ------                     CBC with platelets and d...[[id number]]  Abnormal            Final result                 Please view results for these tests on the individual orders.         Signed Electronically by: Erika Silverio PA-C

## 2024-03-22 NOTE — NURSING NOTE
"Chief Complaint   Patient presents with    Derm Problem     Rash and warmth on arms and legs, noticed yesterday morning    Knee Problem     Bilateral knee swelling     Patient states that on Saturday noticed slight bumps on the upper posterior neck mostly on the right side. Patient states that a redness, warmth rash appeared on her arms and legs yesterday. Patient reports swelling in her knees. Patient states she has tried ibuprofen and benadryl to help with symptoms.    Initial /72 (BP Location: Right arm, Patient Position: Sitting, Cuff Size: Adult Regular)   Pulse 73   Temp 98  F (36.7  C) (Temporal)   Resp 17   Ht 1.651 m (5' 5\")   Wt 84.3 kg (185 lb 14.4 oz)   LMP 02/27/2024 (Approximate)   SpO2 100%   Breastfeeding No   BMI 30.94 kg/m   Estimated body mass index is 30.94 kg/m  as calculated from the following:    Height as of this encounter: 1.651 m (5' 5\").    Weight as of this encounter: 84.3 kg (185 lb 14.4 oz).       FOOD SECURITY SCREENING QUESTIONS:    The next two questions are to help us understand your food security.  If you are feeling you need any assistance in this area, we have resources available to support you today.    Hunger Vital Signs:  Within the past 12 months we worried whether our food would run out before we got money to buy more. Never  Within the past 12 months the food we bought just didn't last and we didn't have money to get more. Never  Tatiana Bean LPN on 3/22/2024 at 10:46 AM     Tatiana Bean   "

## 2024-03-24 ENCOUNTER — HOSPITAL ENCOUNTER (EMERGENCY)
Facility: OTHER | Age: 39
Discharge: HOME | End: 2024-03-24
Attending: STUDENT IN AN ORGANIZED HEALTH CARE EDUCATION/TRAINING PROGRAM | Admitting: STUDENT IN AN ORGANIZED HEALTH CARE EDUCATION/TRAINING PROGRAM
Payer: COMMERCIAL

## 2024-03-24 VITALS
DIASTOLIC BLOOD PRESSURE: 64 MMHG | HEART RATE: 78 BPM | SYSTOLIC BLOOD PRESSURE: 97 MMHG | RESPIRATION RATE: 20 BRPM | TEMPERATURE: 97.7 F | OXYGEN SATURATION: 98 %

## 2024-03-24 DIAGNOSIS — M79.89 BILATERAL HAND SWELLING: ICD-10-CM

## 2024-03-24 DIAGNOSIS — M25.50 ARTHRALGIA OF MULTIPLE SITES, BILATERAL: ICD-10-CM

## 2024-03-24 LAB
ALBUMIN SERPL BCG-MCNC: 4.3 G/DL (ref 3.5–5.2)
ALP SERPL-CCNC: 42 U/L (ref 40–150)
ALT SERPL W P-5'-P-CCNC: 23 U/L (ref 0–50)
ANION GAP SERPL CALCULATED.3IONS-SCNC: 10 MMOL/L (ref 7–15)
ASO AB SERPL-ACNC: 264 IU/ML
AST SERPL W P-5'-P-CCNC: 24 U/L (ref 0–45)
BASOPHILS # BLD AUTO: 0 10E3/UL (ref 0–0.2)
BASOPHILS NFR BLD AUTO: 1 %
BILIRUB SERPL-MCNC: 0.2 MG/DL
BUN SERPL-MCNC: 11.4 MG/DL (ref 6–20)
CALCIUM SERPL-MCNC: 9.3 MG/DL (ref 8.6–10)
CHLORIDE SERPL-SCNC: 106 MMOL/L (ref 98–107)
CREAT SERPL-MCNC: 0.56 MG/DL (ref 0.51–0.95)
CRP SERPL-MCNC: 9.91 MG/L
DEPRECATED HCO3 PLAS-SCNC: 23 MMOL/L (ref 22–29)
EGFRCR SERPLBLD CKD-EPI 2021: >90 ML/MIN/1.73M2
EOSINOPHIL # BLD AUTO: 0.2 10E3/UL (ref 0–0.7)
EOSINOPHIL NFR BLD AUTO: 5 %
ERYTHROCYTE [DISTWIDTH] IN BLOOD BY AUTOMATED COUNT: 15.2 % (ref 10–15)
ERYTHROCYTE [SEDIMENTATION RATE] IN BLOOD BY WESTERGREN METHOD: 21 MM/HR (ref 0–20)
GLUCOSE SERPL-MCNC: 94 MG/DL (ref 70–99)
HCT VFR BLD AUTO: 35.3 % (ref 35–47)
HGB BLD-MCNC: 10.8 G/DL (ref 11.7–15.7)
HOLD SPECIMEN: NORMAL
IMM GRANULOCYTES # BLD: 0.1 10E3/UL
IMM GRANULOCYTES NFR BLD: 2 %
LYMPHOCYTES # BLD AUTO: 0.6 10E3/UL (ref 0.8–5.3)
LYMPHOCYTES NFR BLD AUTO: 17 %
MCH RBC QN AUTO: 23.9 PG (ref 26.5–33)
MCHC RBC AUTO-ENTMCNC: 30.6 G/DL (ref 31.5–36.5)
MCV RBC AUTO: 78 FL (ref 78–100)
MONOCYTES # BLD AUTO: 0.2 10E3/UL (ref 0–1.3)
MONOCYTES NFR BLD AUTO: 6 %
NEUTROPHILS # BLD AUTO: 2.3 10E3/UL (ref 1.6–8.3)
NEUTROPHILS NFR BLD AUTO: 70 %
NRBC # BLD AUTO: 0 10E3/UL
NRBC BLD AUTO-RTO: 0 /100
PLATELET # BLD AUTO: 317 10E3/UL (ref 150–450)
POTASSIUM SERPL-SCNC: 4 MMOL/L (ref 3.4–5.3)
PROT SERPL-MCNC: 7 G/DL (ref 6.4–8.3)
RBC # BLD AUTO: 4.52 10E6/UL (ref 3.8–5.2)
SODIUM SERPL-SCNC: 139 MMOL/L (ref 135–145)
WBC # BLD AUTO: 3.3 10E3/UL (ref 4–11)

## 2024-03-24 PROCEDURE — 85004 AUTOMATED DIFF WBC COUNT: CPT | Performed by: STUDENT IN AN ORGANIZED HEALTH CARE EDUCATION/TRAINING PROGRAM

## 2024-03-24 PROCEDURE — 80053 COMPREHEN METABOLIC PANEL: CPT | Performed by: STUDENT IN AN ORGANIZED HEALTH CARE EDUCATION/TRAINING PROGRAM

## 2024-03-24 PROCEDURE — 85652 RBC SED RATE AUTOMATED: CPT | Performed by: STUDENT IN AN ORGANIZED HEALTH CARE EDUCATION/TRAINING PROGRAM

## 2024-03-24 PROCEDURE — 86140 C-REACTIVE PROTEIN: CPT | Performed by: STUDENT IN AN ORGANIZED HEALTH CARE EDUCATION/TRAINING PROGRAM

## 2024-03-24 PROCEDURE — 99284 EMERGENCY DEPT VISIT MOD MDM: CPT | Performed by: STUDENT IN AN ORGANIZED HEALTH CARE EDUCATION/TRAINING PROGRAM

## 2024-03-24 PROCEDURE — 36415 COLL VENOUS BLD VENIPUNCTURE: CPT | Performed by: STUDENT IN AN ORGANIZED HEALTH CARE EDUCATION/TRAINING PROGRAM

## 2024-03-24 PROCEDURE — 96372 THER/PROPH/DIAG INJ SC/IM: CPT | Performed by: STUDENT IN AN ORGANIZED HEALTH CARE EDUCATION/TRAINING PROGRAM

## 2024-03-24 PROCEDURE — 250N000011 HC RX IP 250 OP 636: Performed by: STUDENT IN AN ORGANIZED HEALTH CARE EDUCATION/TRAINING PROGRAM

## 2024-03-24 RX ORDER — PREDNISONE 20 MG/1
TABLET ORAL
Qty: 10 TABLET | Refills: 0 | Status: SHIPPED | OUTPATIENT
Start: 2024-03-24 | End: 2024-04-02

## 2024-03-24 RX ORDER — DEXAMETHASONE SODIUM PHOSPHATE 10 MG/ML
10 INJECTION, SOLUTION INTRAMUSCULAR; INTRAVENOUS ONCE
Status: COMPLETED | OUTPATIENT
Start: 2024-03-24 | End: 2024-03-24

## 2024-03-24 RX ORDER — ONDANSETRON 4 MG/1
4 TABLET, ORALLY DISINTEGRATING ORAL ONCE
Status: COMPLETED | OUTPATIENT
Start: 2024-03-24 | End: 2024-03-24

## 2024-03-24 RX ADMIN — DEXAMETHASONE SODIUM PHOSPHATE 10 MG: 10 INJECTION, SOLUTION INTRAMUSCULAR; INTRAVENOUS at 09:42

## 2024-03-24 RX ADMIN — ONDANSETRON 4 MG: 4 TABLET, ORALLY DISINTEGRATING ORAL at 11:09

## 2024-03-24 ASSESSMENT — COLUMBIA-SUICIDE SEVERITY RATING SCALE - C-SSRS
2. HAVE YOU ACTUALLY HAD ANY THOUGHTS OF KILLING YOURSELF IN THE PAST MONTH?: NO
1. IN THE PAST MONTH, HAVE YOU WISHED YOU WERE DEAD OR WISHED YOU COULD GO TO SLEEP AND NOT WAKE UP?: NO
6. HAVE YOU EVER DONE ANYTHING, STARTED TO DO ANYTHING, OR PREPARED TO DO ANYTHING TO END YOUR LIFE?: NO

## 2024-03-24 ASSESSMENT — ACTIVITIES OF DAILY LIVING (ADL)
ADLS_ACUITY_SCORE: 35

## 2024-03-24 NOTE — ED TRIAGE NOTES
Generalized swelling since last Thursday. Rapid clinic on Friday tested for tickborne illness, and labs. Since then swelling has gotten worse, rash on chest to knees that went away. Both knees painful and hard to ambulate.      Triage Assessment (Adult)       Row Name 03/24/24 0869          Triage Assessment    Airway WDL WDL        Respiratory WDL    Respiratory WDL WDL        Skin Circulation/Temperature WDL    Skin Circulation/Temperature WDL WDL        Cardiac WDL    Cardiac WDL WDL        Peripheral/Neurovascular WDL    Peripheral Neurovascular WDL WDL        Cognitive/Neuro/Behavioral WDL    Cognitive/Neuro/Behavioral WDL WDL

## 2024-03-24 NOTE — ED PROVIDER NOTES
History     Chief Complaint   Patient presents with    generalized swelling and rash       Megan Staples is a 38 year old female who presents with peripheral swelling and arthralgias.  Onset 2 to 3 days ago.  Was seen in rapid clinic and tested for Lyme disease which was negative.  Initially had associated rash over her torso and upper extremities and upper legs which has resolved.  There is swelling involving bilateral upper and lower extremities and she has pain in her joints involving her elbows wrists, knees, and ankles.  She feels like is difficult to flex her fingers.  Denies any history of autoimmune disease in her or her family.  No recent new exposures or travel.  No fever, chills, nausea, vomiting, chest pain, dyspnea.    No Known Allergies    Patient Active Problem List    Diagnosis Date Noted    Allergic rhinitis 2018     Priority: Medium    Cervical dysplasia 2015     Priority: Medium     Overview:   LSIL pap  with colposcopy, biopsy SINGH 1; normal pap with + high risk HPV , colp done during second trimester of pregnancy with exam findings consistent with mild dysplasia (no biopsies done) and plan to do PP colp and biopsies. PP colp was not done. Patient presents for physical 17, pap and HPV testing negative. Resume normal intervals.       Tonsillith 2014     Priority: Medium    Contact dermatitis and eczema 2010     Priority: Medium    Other chronic disease of tonsils and adenoids 2010     Priority: Medium       Past Medical History:   Diagnosis Date    Complete or unspecified spontaneous  without complication     Encounter for full-term uncomplicated delivery     Noninflammatory disorder of uterus        Past Surgical History:   Procedure Laterality Date    LAPAROSCOPY DIAGNOSTIC (GENERAL)      dysmenorrhea    TUBAL LIGATION  2015       Family History   Problem Relation Age of Onset    Family History Negative Mother         Good Health     Heart Disease Father         Heart Disease,Atrial fibrillation    No Known Problems Sister     Diabetes Maternal Grandmother     Allergy (Severe) Daughter         Allergies       Social History     Tobacco Use    Smoking status: Former     Types: Cigarettes     Quit date: 2009     Years since quittin.9    Smokeless tobacco: Never   Vaping Use    Vaping Use: Never used   Substance Use Topics    Alcohol use: Yes     Alcohol/week: 1.0 standard drink of alcohol     Types: 1 Standard drinks or equivalent per week    Drug use: Never     Comment: Drug use: No       Medications:    predniSONE (DELTASONE) 20 MG tablet  mupirocin (BACTROBAN) 2 % external ointment  triamcinolone (KENALOG) 0.5 % external ointment        Review of Systems: See HPI for pertinent negatives and positives. All other systems reviewed and found to be negative.    Physical Exam   BP 97/64   Pulse 78   Temp 97.7  F (36.5  C) (Oral)   Resp 20   LMP 2024 (Approximate)   SpO2 98%      General: awake, comfortable  HEENT: atraumatic  Respiratory: normal effort, clear to auscultation bilaterally  Cardiovascular: regular rate and rhythm, no murmurs  Abdomen: soft, nondistended, nontender  Extremities: no deformities, tenderness, mild nonpitting swelling to the hands bilaterally  Skin: warm, dry, no rashes  Neuro: alert, no focal deficits  Psych: appropriate mood and affect    ED Course      Results for orders placed or performed during the hospital encounter of 24 (from the past 24 hour(s))   CRP inflammation   Result Value Ref Range    CRP Inflammation 9.91 (H) <5.00 mg/L   Erythrocyte sedimentation rate auto   Result Value Ref Range    Erythrocyte Sedimentation Rate 21 (H) 0 - 20 mm/hr   Comprehensive metabolic panel   Result Value Ref Range    Sodium 139 135 - 145 mmol/L    Potassium 4.0 3.4 - 5.3 mmol/L    Carbon Dioxide (CO2) 23 22 - 29 mmol/L    Anion Gap 10 7 - 15 mmol/L    Urea Nitrogen 11.4 6.0 - 20.0 mg/dL    Creatinine 0.56  0.51 - 0.95 mg/dL    GFR Estimate >90 >60 mL/min/1.73m2    Calcium 9.3 8.6 - 10.0 mg/dL    Chloride 106 98 - 107 mmol/L    Glucose 94 70 - 99 mg/dL    Alkaline Phosphatase 42 40 - 150 U/L    AST 24 0 - 45 U/L    ALT 23 0 - 50 U/L    Protein Total 7.0 6.4 - 8.3 g/dL    Albumin 4.3 3.5 - 5.2 g/dL    Bilirubin Total 0.2 <=1.2 mg/dL   CBC with platelets differential    Narrative    The following orders were created for panel order CBC with platelets differential.  Procedure                               Abnormality         Status                     ---------                               -----------         ------                     CBC with platelets and d...[[id number]]  Abnormal            Final result                 Please view results for these tests on the individual orders.   Extra Tube ([place] Draw)    Narrative    The following orders were created for panel order Extra Tube ([place] Draw).  Procedure                               Abnormality         Status                     ---------                               -----------         ------                     Extra Blue Top Tube[[id number]]                              Final result               Extra Red Top Tube[[id number]]                               Final result               Extra Green Top (Lithium...[[id number]]                      Final result                 Please view results for these tests on the individual orders.   CBC with platelets and differential   Result Value Ref Range    WBC Count 3.3 (L) 4.0 - 11.0 10e3/uL    RBC Count 4.52 3.80 - 5.20 10e6/uL    Hemoglobin 10.8 (L) 11.7 - 15.7 g/dL    Hematocrit 35.3 35.0 - 47.0 %    MCV 78 78 - 100 fL    MCH 23.9 (L) 26.5 - 33.0 pg    MCHC 30.6 (L) 31.5 - 36.5 g/dL    RDW 15.2 (H) 10.0 - 15.0 %    Platelet Count 317 150 - 450 10e3/uL    % Neutrophils 70 %    % Lymphocytes 17 %    % Monocytes 6 %    % Eosinophils 5 %    % Basophils 1 %    % Immature Granulocytes 2 %    NRBCs per 100 WBC 0 <1 /100     Absolute Neutrophils 2.3 1.6 - 8.3 10e3/uL    Absolute Lymphocytes 0.6 (L) 0.8 - 5.3 10e3/uL    Absolute Monocytes 0.2 0.0 - 1.3 10e3/uL    Absolute Eosinophils 0.2 0.0 - 0.7 10e3/uL    Absolute Basophils 0.0 0.0 - 0.2 10e3/uL    Absolute Immature Granulocytes 0.1 <=0.4 10e3/uL    Absolute NRBCs 0.0 10e3/uL   Extra Blue Top Tube   Result Value Ref Range    Hold Specimen JIC    Extra Red Top Tube   Result Value Ref Range    Hold Specimen JIC    Extra Green Top (Lithium Heparin) Tube   Result Value Ref Range    Hold Specimen JIC        Medications   dexAMETHasone PF (DECADRON) injection 10 mg (10 mg Intramuscular $Given 3/24/24 0910)   ondansetron (ZOFRAN ODT) ODT tab 4 mg (4 mg Oral $Given 3/24/24 1109)       Assessments & Plan (with Medical Decision Making)     I have reviewed the nursing notes.          38 year old female evaluated for several days of distal bilateral symmetric arthralgias and mild edema there was initially associate with a rash that self resolved.  Evaluation remarkable for mild swelling of both hands.  Test demonstrate mild elevation in inflammatory markers.  Higher in differential is autoimmune condition.  Patient does have PCP follow-up in 2 days for additional workup.  She was treated per above with Decadron with symptomatic improvement.  There was some mild nausea after the Decadron and therefore this was treated with a dose of Zofran.  Plan for short course of steroids and close PCP follow-up and return for worsening or other concerning symptoms.    I have reviewed the findings, diagnosis, plan, and need for any follow up with the patient.    Patient instructions:     New Prescriptions    PREDNISONE (DELTASONE) 20 MG TABLET    Take two tablets (= 40mg) each day for 5 (five) days       Final diagnoses:   Arthralgia of multiple sites, bilateral   Bilateral hand swelling       3/24/2024   [facility]     Scott Yanez MD  03/24/24 3329

## 2024-03-24 NOTE — DISCHARGE INSTRUCTIONS
Start prednisone steroid once daily for 5 days with first dose tomorrow.  Follow-up with PCP as planned in 2 days.  Your labs were reassuring except for mild increase of your inflammatory blood tests.  Often times autoimmune conditions can cause your symptoms as well as inflammatory blood tests to be elevated.  Hopefully the steroids will continue to help you.  Do not use NSAIDs such as ibuprofen Advil Aleve while taking steroids, as this can be extra hard on your stomach.    Return to emergency department for concerning change or worsening symptoms.

## 2024-03-25 LAB
B BURGDOR IGG SERPL QL IA: 0.11 INDEX
B BURGDOR IGG SERPL QL IA: NONREACTIVE
B BURGDOR IGG+IGM SER QL: 1.09
B BURGDOR IGM SERPL QL IA: 0.25 INDEX
B BURGDOR IGM SERPL QL IA: NONREACTIVE

## 2024-03-26 ENCOUNTER — TELEPHONE (OUTPATIENT)
Dept: FAMILY MEDICINE | Facility: OTHER | Age: 39
End: 2024-03-26
Payer: COMMERCIAL

## 2024-03-26 NOTE — TELEPHONE ENCOUNTER
Herminia,    Appt with family medicine on 4/2.  Are you able to order additional testing in the interim?  Should I be working her in to see someone in clinic sooner?      Adrián and Navil out until 4/1.  Just wanted to get your recommendation.     Brittni Zavala RN on 3/26/2024 at 10:48 AM

## 2024-03-26 NOTE — TELEPHONE ENCOUNTER
Spoke with patient regarding her symptoms as well as follow-up.  She does state that the prednisone is helping, did have some questions regarding the Lyme testing.  Discussed with patient that the confirmatory test was negative, it is not Lyme disease.  With elevated inflammatory markers, possible that it is rheumatologic versus viral inflammatory.  At this time, there is no further lab work that needs to be completed urgently, but rather would recommend that she continues to follow-up with PCP, she does have an appointment scheduled on 4-2-24.  She is comfortable with this plan.

## 2024-04-02 ENCOUNTER — OFFICE VISIT (OUTPATIENT)
Dept: FAMILY MEDICINE | Facility: OTHER | Age: 39
End: 2024-04-02
Attending: FAMILY MEDICINE
Payer: COMMERCIAL

## 2024-04-02 VITALS
OXYGEN SATURATION: 98 % | TEMPERATURE: 98.6 F | DIASTOLIC BLOOD PRESSURE: 70 MMHG | WEIGHT: 187 LBS | RESPIRATION RATE: 18 BRPM | SYSTOLIC BLOOD PRESSURE: 112 MMHG | HEIGHT: 65 IN | HEART RATE: 72 BPM | BODY MASS INDEX: 31.16 KG/M2

## 2024-04-02 DIAGNOSIS — R79.82 ELEVATED C-REACTIVE PROTEIN (CRP): ICD-10-CM

## 2024-04-02 DIAGNOSIS — M79.10 MYALGIA: ICD-10-CM

## 2024-04-02 DIAGNOSIS — R21 RASH: Primary | ICD-10-CM

## 2024-04-02 DIAGNOSIS — D64.9 ANEMIA, UNSPECIFIED TYPE: ICD-10-CM

## 2024-04-02 LAB
BASOPHILS # BLD AUTO: 0.1 10E3/UL (ref 0–0.2)
BASOPHILS NFR BLD AUTO: 2 %
CK SERPL-CCNC: 52 U/L (ref 26–192)
CRP SERPL-MCNC: 4.49 MG/L
EOSINOPHIL # BLD AUTO: 0.3 10E3/UL (ref 0–0.7)
EOSINOPHIL NFR BLD AUTO: 5 %
ERYTHROCYTE [DISTWIDTH] IN BLOOD BY AUTOMATED COUNT: 16 % (ref 10–15)
ERYTHROCYTE [SEDIMENTATION RATE] IN BLOOD BY WESTERGREN METHOD: 14 MM/HR (ref 0–20)
HCT VFR BLD AUTO: 36.4 % (ref 35–47)
HGB BLD-MCNC: 11.2 G/DL (ref 11.7–15.7)
IMM GRANULOCYTES # BLD: 0 10E3/UL
IMM GRANULOCYTES NFR BLD: 0 %
LYMPHOCYTES # BLD AUTO: 1.2 10E3/UL (ref 0.8–5.3)
LYMPHOCYTES NFR BLD AUTO: 20 %
MCH RBC QN AUTO: 24.6 PG (ref 26.5–33)
MCHC RBC AUTO-ENTMCNC: 30.8 G/DL (ref 31.5–36.5)
MCV RBC AUTO: 80 FL (ref 78–100)
MONOCYTES # BLD AUTO: 0.5 10E3/UL (ref 0–1.3)
MONOCYTES NFR BLD AUTO: 8 %
NEUTROPHILS # BLD AUTO: 4 10E3/UL (ref 1.6–8.3)
NEUTROPHILS NFR BLD AUTO: 65 %
NRBC # BLD AUTO: 0 10E3/UL
NRBC BLD AUTO-RTO: 0 /100
PLATELET # BLD AUTO: 350 10E3/UL (ref 150–450)
RBC # BLD AUTO: 4.55 10E6/UL (ref 3.8–5.2)
RHEUMATOID FACT SERPL-ACNC: <10 IU/ML
WBC # BLD AUTO: 6.2 10E3/UL (ref 4–11)

## 2024-04-02 PROCEDURE — 36415 COLL VENOUS BLD VENIPUNCTURE: CPT | Mod: ZL | Performed by: FAMILY MEDICINE

## 2024-04-02 PROCEDURE — 82550 ASSAY OF CK (CPK): CPT | Mod: ZL | Performed by: FAMILY MEDICINE

## 2024-04-02 PROCEDURE — 99214 OFFICE O/P EST MOD 30 MIN: CPT | Performed by: FAMILY MEDICINE

## 2024-04-02 PROCEDURE — 86140 C-REACTIVE PROTEIN: CPT | Mod: ZL | Performed by: FAMILY MEDICINE

## 2024-04-02 PROCEDURE — 86431 RHEUMATOID FACTOR QUANT: CPT | Mod: ZL | Performed by: FAMILY MEDICINE

## 2024-04-02 PROCEDURE — 86038 ANTINUCLEAR ANTIBODIES: CPT | Mod: ZL | Performed by: FAMILY MEDICINE

## 2024-04-02 PROCEDURE — 86618 LYME DISEASE ANTIBODY: CPT | Mod: ZL | Performed by: FAMILY MEDICINE

## 2024-04-02 PROCEDURE — 85652 RBC SED RATE AUTOMATED: CPT | Mod: ZL | Performed by: FAMILY MEDICINE

## 2024-04-02 PROCEDURE — 85025 COMPLETE CBC W/AUTO DIFF WBC: CPT | Mod: ZL | Performed by: FAMILY MEDICINE

## 2024-04-02 PROCEDURE — 86617 LYME DISEASE ANTIBODY: CPT | Mod: ZL | Performed by: FAMILY MEDICINE

## 2024-04-02 ASSESSMENT — PAIN SCALES - GENERAL: PAINLEVEL: NO PAIN (0)

## 2024-04-02 NOTE — PROGRESS NOTES
"  Assessment & Plan   Healthy 38-year-old female presents with 2-week history of transient rash, joint swelling/arthralgias, elevated inflammatory markers and normocytic anemia.    Symptoms have completely resolved after Decadron and burst of prednisone.    Most likely viral syndrome, consider HSP although uncommon in adults, rash sounds more urticarial than purpura. Could be initial presentation of other autoimmune process, rule-out RA, lupus. Repeat Lyme testing as it was done within 24 hours of symptoms.     Rash    - Anti Nuclear Lori IgG by IFA with Reflex; Future  - Rheumatoid factor; Future  - CK Total; Future  - CK Total  - Rheumatoid factor  - Anti Nuclear Lori IgG by IFA with Reflex    Myalgia    - Lyme Disease Total Abs Bld with Reflex to Confirm CLIA; Future  - Rheumatoid factor; Future  - Rheumatoid factor  - Lyme Disease Total Abs Bld with Reflex to Confirm CLIA    Anemia, unspecified type    - CBC and Differential; Future  - CBC and Differential    Elevated C-reactive protein (CRP)    - Sedimentation Rate (ESR); Future  - CRP inflammation; Future  - CRP inflammation  - Sedimentation Rate (ESR)            BMI  Estimated body mass index is 31.12 kg/m  as calculated from the following:    Height as of this encounter: 1.651 m (5' 5\").    Weight as of this encounter: 84.8 kg (187 lb).     No follow-ups on file.    Heide Guzman is a 38 year old, presenting for the following health issues:  Follow Up (RC, rash )  38-year-old female presents for follow-up after recent rapid care and emergency room visit.     March 16: She noticed 2 small tender postauricular lymph nodes.  She had no other symptoms at that time  March 21: She developed a full-body erythematous rash after showering.  She describes it as blotchy red spots from her neck extending into her upper thighs.  Mildly pruritic.  3/22: bilateral elbow and knee swelling, \"inflamed\" her shoes also felt tight that day.  She presented to rapid care.  " "CRP and sed rate were mildly elevated.  She was afebrile and no other findings on exam.  She was told to use Zyrtec and Benadryl.  Initial Lyme test was positive, confirmation test negative.  3/24: Swelling worsened and extended into her hands and feet.  She was unable to get her rings off.  Rash had completely resolved by this time.  She presented the emergency room.  Repeat labs showed slight increase in CRP and sed rate.  Hemoglobin was slightly low at 10.8.  She was given a shot of Decadron and started on prednisone burst.  Swelling and discomfort completely resolved.    She is here to discuss possible further workup for autoimmune disease as mentioned by the ER doctor.    No family history of autoimmune disease.  History of menorrhagia however the last few months her menses has been regular, flow lasting 4 to 5 days.          4/2/2024     8:19 AM   Additional Questions   Roomed by Bridget montanez     History of Present Illness       Reason for visit:  Inflammation  Symptom onset:  3-7 days ago    She eats 0-1 servings of fruits and vegetables daily.She consumes 1 sweetened beverage(s) daily.She exercises with enough effort to increase her heart rate 30 to 60 minutes per day.  She exercises with enough effort to increase her heart rate 4 days per week.   She is taking medications regularly.                 Review of Systems  Constitutional, HEENT, cardiovascular, pulmonary, gi and gu systems are negative, except as otherwise noted.      Objective    /70   Pulse 72   Temp 98.6  F (37  C) (Tympanic)   Resp 18   Ht 1.651 m (5' 5\")   Wt 84.8 kg (187 lb)   LMP 03/27/2024 (Approximate)   SpO2 98%   Breastfeeding No   BMI 31.12 kg/m    Body mass index is 31.12 kg/m .  Physical Exam  Cardiovascular:      Rate and Rhythm: Normal rate and regular rhythm.   Pulmonary:      Effort: Pulmonary effort is normal. No respiratory distress.   Musculoskeletal:         General: No swelling or tenderness.      Cervical back: " Normal range of motion. No rigidity.      Comments: No joint effusion   Lymphadenopathy:      Cervical: No cervical adenopathy.   Skin:     General: Skin is warm.      Findings: No erythema or rash.   Neurological:      General: No focal deficit present.      Mental Status: She is alert.            Results for orders placed or performed in visit on 04/02/24 (from the past 24 hour(s))   Sedimentation Rate (ESR)   Result Value Ref Range    Erythrocyte Sedimentation Rate 14 0 - 20 mm/hr   CBC and Differential    Narrative    The following orders were created for panel order CBC and Differential.  Procedure                               Abnormality         Status                     ---------                               -----------         ------                     CBC with platelets and d...[[id number]]  Abnormal            Final result                 Please view results for these tests on the individual orders.   CBC with platelets and differential   Result Value Ref Range    WBC Count 6.2 4.0 - 11.0 10e3/uL    RBC Count 4.55 3.80 - 5.20 10e6/uL    Hemoglobin 11.2 (L) 11.7 - 15.7 g/dL    Hematocrit 36.4 35.0 - 47.0 %    MCV 80 78 - 100 fL    MCH 24.6 (L) 26.5 - 33.0 pg    MCHC 30.8 (L) 31.5 - 36.5 g/dL    RDW 16.0 (H) 10.0 - 15.0 %    Platelet Count 350 150 - 450 10e3/uL    % Neutrophils 65 %    % Lymphocytes 20 %    % Monocytes 8 %    % Eosinophils 5 %    % Basophils 2 %    % Immature Granulocytes 0 %    NRBCs per 100 WBC 0 <1 /100    Absolute Neutrophils 4.0 1.6 - 8.3 10e3/uL    Absolute Lymphocytes 1.2 0.8 - 5.3 10e3/uL    Absolute Monocytes 0.5 0.0 - 1.3 10e3/uL    Absolute Eosinophils 0.3 0.0 - 0.7 10e3/uL    Absolute Basophils 0.1 0.0 - 0.2 10e3/uL    Absolute Immature Granulocytes 0.0 <=0.4 10e3/uL    Absolute NRBCs 0.0 10e3/uL           Signed Electronically by: Latanya Goyal MD

## 2024-04-02 NOTE — NURSING NOTE
Patient is here to follow up from recent RC visit. States has a rash from neck down, swollen elbows and then knees, swelling in feet and hands. Did go to ER Sunday.     Patient's last menstrual period was 03/27/2024 (approximate).  Medication Reconciliation: complete    Bridget Kim LPN 4/2/2024 8:23 AM       Advance care directive on file? no  Advance care directive provided to patient? no       Bridget Kim LPN

## 2024-04-03 LAB
ANA SER QL IF: NEGATIVE
B BURGDOR IGG SERPL QL IA: 0.11 INDEX
B BURGDOR IGG SERPL QL IA: NONREACTIVE
B BURGDOR IGG+IGM SER QL: 1.19
B BURGDOR IGM SERPL QL IA: 0.2 INDEX
B BURGDOR IGM SERPL QL IA: NONREACTIVE

## 2024-04-04 ENCOUNTER — MYC MEDICAL ADVICE (OUTPATIENT)
Dept: FAMILY MEDICINE | Facility: OTHER | Age: 39
End: 2024-04-04
Payer: COMMERCIAL

## 2024-07-13 ENCOUNTER — HEALTH MAINTENANCE LETTER (OUTPATIENT)
Age: 39
End: 2024-07-13

## 2024-09-11 ENCOUNTER — HOSPITAL ENCOUNTER (OUTPATIENT)
Dept: GENERAL RADIOLOGY | Facility: OTHER | Age: 39
Discharge: HOME | End: 2024-09-11
Payer: COMMERCIAL

## 2024-09-11 ENCOUNTER — OFFICE VISIT (OUTPATIENT)
Dept: FAMILY MEDICINE | Facility: OTHER | Age: 39
End: 2024-09-11
Attending: NURSE PRACTITIONER
Payer: COMMERCIAL

## 2024-09-11 VITALS
DIASTOLIC BLOOD PRESSURE: 71 MMHG | BODY MASS INDEX: 31.52 KG/M2 | HEART RATE: 71 BPM | HEIGHT: 65 IN | SYSTOLIC BLOOD PRESSURE: 102 MMHG | OXYGEN SATURATION: 96 % | WEIGHT: 189.2 LBS | RESPIRATION RATE: 14 BRPM | TEMPERATURE: 97.8 F

## 2024-09-11 DIAGNOSIS — R06.2 WHEEZING: ICD-10-CM

## 2024-09-11 DIAGNOSIS — J06.9 VIRAL URI WITH COUGH: Primary | ICD-10-CM

## 2024-09-11 DIAGNOSIS — R05.2 SUBACUTE COUGH: ICD-10-CM

## 2024-09-11 PROCEDURE — 99213 OFFICE O/P EST LOW 20 MIN: CPT

## 2024-09-11 PROCEDURE — 71046 X-RAY EXAM CHEST 2 VIEWS: CPT

## 2024-09-11 RX ORDER — BENZONATATE 100 MG/1
100 CAPSULE ORAL 3 TIMES DAILY PRN
Qty: 30 CAPSULE | Refills: 0 | Status: SHIPPED | OUTPATIENT
Start: 2024-09-11

## 2024-09-11 RX ORDER — ALBUTEROL SULFATE 90 UG/1
2 AEROSOL, METERED RESPIRATORY (INHALATION) EVERY 6 HOURS PRN
Qty: 18 G | Refills: 0 | Status: SHIPPED | OUTPATIENT
Start: 2024-09-11

## 2024-09-11 ASSESSMENT — PAIN SCALES - GENERAL: PAINLEVEL: NO PAIN (0)

## 2024-09-11 NOTE — PROGRESS NOTES
ASSESSMENT/PLAN:    I have reviewed the nursing notes.  I have reviewed the findings, diagnosis, plan and need for follow up with the patient.    1. Viral URI with cough  2. Subacute cough  3. Wheezing  - XR Chest 2 Views  - albuterol (PROAIR HFA/PROVENTIL HFA/VENTOLIN HFA) 108 (90 Base) MCG/ACT inhaler; Inhale 2 puffs into the lungs every 6 hours as needed for shortness of breath, wheezing or cough.  Dispense: 18 g; Refill: 0  - benzonatate (TESSALON) 100 MG capsule; Take 1 capsule (100 mg) by mouth 3 times daily as needed for cough.  Dispense: 30 capsule; Refill: 0    Patient presents with upper respiratory symptoms.  Patient's vitals are stable and she appears nontoxic.  Chest x-ray was negative for any signs of infection or other abnormalities.  Discussed results with patient in clinic. Discussed with patient viral vs bacterial respiratory illness, and evidence based practice and guidelines for cough without fever or infiltrate on xray are not indicative of pneumonia and should not be treated with antibiotics.  Discussed with patient that symptoms and exam are consistent with viral illness.  Discussed that symptomatic treatment of cough is appropriate but not with antibiotics.  Will treat patient's cough and wheezing with Tessalon Perles and an albuterol inhaler as needed. Discussed symptomatic treatment - Encouraged fluids, salt water gargles, honey (only if greater than 1 year in age due to risk of botulism), elevation, humidifier, sinus rinse/netti pot, lozenges, tea, topical vapor rub, popsicles, rest, etc. May use over-the-counter Tylenol or ibuprofen PRN.    Discussed warning signs/symptoms indicative of need to f/u    Follow up if symptoms persist or worsen or concerns    I explained my diagnostic considerations and recommendations to the patient, who voiced understanding and agreement with the treatment plan. All questions were answered. We discussed potential side effects of any prescribed or  recommended therapies, as well as expectations for response to treatments.    SUDHIR Forbes CNP  2024  11:57 AM    HPI:    Megan Staples is a 38 year old female  who presents [facility] today for concerns of cough    URI, x 2 weeks    Symptoms:  No fevers or chills.  No sore throat/pharyngitis/tonsillitis.   YES: +  allergy/URI Symptoms  No muffled sounds/change in hearing  No sensation of fullness in ear(s)  No ringing in ears/tinnitus  No balance changes  No dizziness  YES: +  congestion (head/nasal/chest)  YES: +  cough/productive cough  YES: +  post nasal drip   No headache  No sinus pain/pressure  No myalgias  No otalgia  No rash  Activity Level Changes: No  Appetite/Liquid Intake Changes: No  Changes to Bowel Habits: No  Changes to Bladder Habits: No  Additional Symptoms to Report: Yes: wheezing, mild shortness of breath with coughing  History of similar symptoms: No  Prior workup: No    Treatments tried: Antihistamine, Fluids, and Rest    Site of exposure: not known.  Type of exposure: not known    Other Pertinent History: none    Allergies: NKA    PCP: Tofte    Past Medical History:   Diagnosis Date    Complete or unspecified spontaneous  without complication     cytotec used for medical treatment    Encounter for full-term uncomplicated delivery     x 3    Noninflammatory disorder of uterus     No Comments Provided     Past Surgical History:   Procedure Laterality Date    LAPAROSCOPY DIAGNOSTIC (GENERAL)  1998    dysmenorrhea    TUBAL LIGATION       Social History     Tobacco Use    Smoking status: Former     Current packs/day: 0.00     Types: Cigarettes     Quit date: 2009     Years since quitting: 15.4     Passive exposure: Past    Smokeless tobacco: Never   Substance Use Topics    Alcohol use: Yes     Alcohol/week: 1.0 standard drink of alcohol     Types: 1 Standard drinks or equivalent per week     No current outpatient medications on file.     No Known  "Allergies  Past medical history, past surgical history, current medications and allergies reviewed and accurate to the best of my knowledge.      ROS:  Refer to HPI    /71 (BP Location: Right arm, Patient Position: Sitting, Cuff Size: Adult Regular)   Pulse 71   Temp 97.8  F (36.6  C) (Temporal)   Resp 14   Ht 1.651 m (5' 5\")   Wt 85.8 kg (189 lb 3.2 oz)   LMP 09/06/2024 (Approximate)   SpO2 96%   BMI 31.48 kg/m      EXAM:  General Appearance: Well appearing 38 year old female, appropriate appearance for age. No acute distress   Ears: Left TM intact, translucent with bony landmarks appreciated, no erythema, no effusion, no bulging, no purulence.  Right TM intact, translucent with bony landmarks appreciated, no erythema, no effusion, no bulging, no purulence.  Left auditory canal clear.  Right auditory canal clear.  Normal external ears, non tender.  Eyes: conjunctivae normal without erythema or irritation, corneas clear, no drainage or crusting, no eyelid swelling, pupils equal   Oropharynx: moist mucous membranes, posterior pharynx without erythema, tonsils symmetric and 1+, no erythema, no exudates or petechiae, no post nasal drip seen, no trismus, voice clear.    Nose:  Bilateral nares: no erythema, no edema, no drainage or congestion   Neck: supple without adenopathy  Respiratory: normal chest wall and respirations.  Normal effort.  Mild wheezing, no crackles or rhonchi.  No increased work of breathing.  Mild cough appreciated.  Cardiac: RRR with no murmurs  Musculoskeletal:  Equal movement of bilateral upper extremities.  Equal movement of bilateral lower extremities.  Normal gait.    Dermatological: no rashes noted of exposed skin  Neuro: Alert and oriented to person, place, and time.    Psychological: normal affect, alert, oriented, and pleasant.     Xray:  Results for orders placed or performed in visit on 09/11/24   XR Chest 2 Views     Status: None    Narrative    PROCEDURE:  XR CHEST 2 " VIEWS    HISTORY: Subacute cough; Wheezing    COMPARISON: No relevant priors available for comparison    FINDINGS: PA and lateral chest radiographs    Cardiomediastinal silhouette is within normal limits.  No focal consolidation, effusion or pneumothorax.    No suspicious osseous lesion or subdiaphragmatic free air.      Impression    IMPRESSION:    No acute findings.    DENISSE LZOADA MD         SYSTEM ID:  [id number]

## 2024-09-11 NOTE — NURSING NOTE
"Chief Complaint   Patient presents with    Cough       Patient presents to the rapid clinic today for a cough. Patient states that she has had this cough for 2 weeks and it gets worse at night when she is laying down.vPatient states that when she coughs she can hear her chest \"rattle or wheeze\". Patient states that no phlegm comes up when she coughs.     Initial /71 (BP Location: Right arm, Patient Position: Sitting, Cuff Size: Adult Regular)   Pulse 71   Temp 97.8  F (36.6  C) (Temporal)   Resp 14   Ht 1.651 m (5' 5\")   Wt 85.8 kg (189 lb 3.2 oz)   LMP 09/06/2024 (Approximate)   SpO2 96%   BMI 31.48 kg/m   Estimated body mass index is 31.48 kg/m  as calculated from the following:    Height as of this encounter: 1.651 m (5' 5\").    Weight as of this encounter: 85.8 kg (189 lb 3.2 oz).     FOOD SECURITY SCREENING QUESTIONS:    The next two questions are to help us understand your food security.  If you are feeling you need any assistance in this area, we have resources available to support you today.    Hunger Vital Signs:  Within the past 12 months we worried whether our food would run out before we got money to buy more. Never  Within the past 12 months the food we bought just didn't last and we didn't have money to get more. Never      Nathan Walton   "

## 2025-01-12 ENCOUNTER — HEALTH MAINTENANCE LETTER (OUTPATIENT)
Age: 40
End: 2025-01-12

## 2025-02-18 ENCOUNTER — E-VISIT (OUTPATIENT)
Dept: URGENT CARE | Facility: CLINIC | Age: 40
End: 2025-02-18
Payer: COMMERCIAL

## 2025-02-18 DIAGNOSIS — N30.00 ACUTE CYSTITIS WITHOUT HEMATURIA: ICD-10-CM

## 2025-02-18 DIAGNOSIS — R30.0 DYSURIA: ICD-10-CM

## 2025-02-18 DIAGNOSIS — N76.0 VAGINITIS AND VULVOVAGINITIS: Primary | ICD-10-CM

## 2025-02-18 PROCEDURE — 99207 PR NON-BILLABLE SERV PER CHARTING: CPT | Performed by: EMERGENCY MEDICINE

## 2025-02-18 RX ORDER — NITROFURANTOIN 25; 75 MG/1; MG/1
100 CAPSULE ORAL 2 TIMES DAILY
Qty: 10 CAPSULE | Refills: 0 | Status: SHIPPED | OUTPATIENT
Start: 2025-02-18 | End: 2025-02-23

## 2025-02-18 NOTE — TELEPHONE ENCOUNTER
Note: Patient just started her menses.  I will empirically treat her UTI with follow-up in 5 to 7 days of any persistent symptoms which was discussed with patient through e-visit exchange.  JOSE Haney MD       provider E-Visit time total (minutes):  Less than 5 minutes.

## 2025-04-16 ENCOUNTER — HOSPITAL ENCOUNTER (OUTPATIENT)
Dept: MAMMOGRAPHY | Facility: OTHER | Age: 40
Discharge: HOME | End: 2025-04-16
Attending: FAMILY MEDICINE
Payer: COMMERCIAL

## 2025-04-16 DIAGNOSIS — Z12.31 VISIT FOR SCREENING MAMMOGRAM: ICD-10-CM

## 2025-04-16 PROCEDURE — 77067 SCR MAMMO BI INCL CAD: CPT

## 2025-04-16 PROCEDURE — 77063 BREAST TOMOSYNTHESIS BI: CPT

## 2025-04-22 SDOH — HEALTH STABILITY: PHYSICAL HEALTH: ON AVERAGE, HOW MANY MINUTES DO YOU ENGAGE IN EXERCISE AT THIS LEVEL?: 30 MIN

## 2025-04-22 SDOH — HEALTH STABILITY: PHYSICAL HEALTH: ON AVERAGE, HOW MANY DAYS PER WEEK DO YOU ENGAGE IN MODERATE TO STRENUOUS EXERCISE (LIKE A BRISK WALK)?: 4 DAYS

## 2025-04-22 ASSESSMENT — SOCIAL DETERMINANTS OF HEALTH (SDOH): HOW OFTEN DO YOU GET TOGETHER WITH FRIENDS OR RELATIVES?: THREE TIMES A WEEK

## 2025-04-23 ENCOUNTER — OFFICE VISIT (OUTPATIENT)
Dept: FAMILY MEDICINE | Facility: OTHER | Age: 40
End: 2025-04-23
Attending: FAMILY MEDICINE
Payer: COMMERCIAL

## 2025-04-23 VITALS
WEIGHT: 190.4 LBS | HEART RATE: 71 BPM | TEMPERATURE: 97.8 F | DIASTOLIC BLOOD PRESSURE: 74 MMHG | SYSTOLIC BLOOD PRESSURE: 118 MMHG | HEIGHT: 65 IN | RESPIRATION RATE: 16 BRPM | BODY MASS INDEX: 31.72 KG/M2 | OXYGEN SATURATION: 99 %

## 2025-04-23 DIAGNOSIS — E66.811 CLASS 1 OBESITY WITHOUT SERIOUS COMORBIDITY WITH BODY MASS INDEX (BMI) OF 32.0 TO 32.9 IN ADULT, UNSPECIFIED OBESITY TYPE: ICD-10-CM

## 2025-04-23 DIAGNOSIS — R00.2 HEART PALPITATIONS: ICD-10-CM

## 2025-04-23 DIAGNOSIS — Z00.00 HEALTH CARE MAINTENANCE: Primary | ICD-10-CM

## 2025-04-23 LAB
ANION GAP SERPL CALCULATED.3IONS-SCNC: 9 MMOL/L (ref 7–15)
BUN SERPL-MCNC: 8 MG/DL (ref 6–20)
CALCIUM SERPL-MCNC: 9.5 MG/DL (ref 8.8–10.4)
CHLORIDE SERPL-SCNC: 104 MMOL/L (ref 98–107)
CHOLEST SERPL-MCNC: 225 MG/DL
CREAT SERPL-MCNC: 0.64 MG/DL (ref 0.51–0.95)
EGFRCR SERPLBLD CKD-EPI 2021: >90 ML/MIN/1.73M2
EST. AVERAGE GLUCOSE BLD GHB EST-MCNC: 105 MG/DL
FASTING STATUS PATIENT QL REPORTED: NO
FASTING STATUS PATIENT QL REPORTED: NO
GLUCOSE SERPL-MCNC: 95 MG/DL (ref 70–99)
HBA1C MFR BLD: 5.3 %
HCO3 SERPL-SCNC: 27 MMOL/L (ref 22–29)
HDLC SERPL-MCNC: 80 MG/DL
LDLC SERPL CALC-MCNC: 134 MG/DL
NONHDLC SERPL-MCNC: 145 MG/DL
POTASSIUM SERPL-SCNC: 3.7 MMOL/L (ref 3.4–5.3)
SODIUM SERPL-SCNC: 140 MMOL/L (ref 135–145)
TRIGL SERPL-MCNC: 56 MG/DL
TSH SERPL DL<=0.005 MIU/L-ACNC: 0.8 UIU/ML (ref 0.3–4.2)

## 2025-04-23 PROCEDURE — 80061 LIPID PANEL: CPT | Mod: ZL | Performed by: FAMILY MEDICINE

## 2025-04-23 PROCEDURE — 83036 HEMOGLOBIN GLYCOSYLATED A1C: CPT | Mod: ZL | Performed by: FAMILY MEDICINE

## 2025-04-23 PROCEDURE — 84443 ASSAY THYROID STIM HORMONE: CPT | Mod: ZL | Performed by: FAMILY MEDICINE

## 2025-04-23 PROCEDURE — 80048 BASIC METABOLIC PNL TOTAL CA: CPT | Mod: ZL | Performed by: FAMILY MEDICINE

## 2025-04-23 PROCEDURE — 36415 COLL VENOUS BLD VENIPUNCTURE: CPT | Mod: ZL | Performed by: FAMILY MEDICINE

## 2025-04-23 ASSESSMENT — PAIN SCALES - GENERAL: PAINLEVEL_OUTOF10: NO PAIN (0)

## 2025-04-23 NOTE — NURSING NOTE
"Chief Complaint   Patient presents with    Physical     Yearly physical.        Initial /74 (BP Location: Right arm, Patient Position: Sitting, Cuff Size: Adult Regular)   Pulse 71   Temp 97.8  F (36.6  C) (Tympanic)   Resp 16   Ht 1.638 m (5' 4.5\")   Wt 86.4 kg (190 lb 6.4 oz)   LMP 04/14/2025   SpO2 99%   BMI 32.18 kg/m   Estimated body mass index is 32.18 kg/m  as calculated from the following:    Height as of this encounter: 1.638 m (5' 4.5\").    Weight as of this encounter: 86.4 kg (190 lb 6.4 oz).  Medication Reconciliation: complete    Lois Tanner LPN    Advance Care Directive reviewed    "

## 2025-04-23 NOTE — PROGRESS NOTES
"Preventive Care Visit  [facility]  Rhea Sampson MD, Family Medicine  Apr 23, 2025      Assessment & Plan       ICD-10-CM    1. Health care maintenance  Z00.00 Lipid Panel     Hemoglobin A1c     Lipid Panel     Hemoglobin A1c      2. Heart palpitations  R00.2 TSH Reflex GH     Basic Metabolic Panel     TSH Reflex GH     Basic Metabolic Panel      3. Class 1 obesity without serious comorbidity with body mass index (BMI) of 32.0 to 32.9 in adult, unspecified obesity type  E66.811     Z68.32         Labs today  Mammogram/pap up to date  Reviewed options for weight loss/support for lifestyle changes.    Reviewed potential contributing factors to palpitations. Currently very rare and brief. Discussed indications for additional work up. Will check TSH.         BMI  Estimated body mass index is 32.18 kg/m  as calculated from the following:    Height as of this encounter: 1.638 m (5' 4.5\").    Weight as of this encounter: 86.4 kg (190 lb 6.4 oz).       Counseling  Appropriate preventive services were addressed with this patient via screening, questionnaire, or discussion as appropriate for fall prevention, nutrition, physical activity, Tobacco-use cessation, social engagement, weight loss and cognition.  Checklist reviewing preventive services available has been given to the patient.  Reviewed patient's diet, addressing concerns and/or questions.         Heide Guzman is a 39 year old, presenting for the following:  Physical (Yearly physical. )        4/23/2025    12:55 PM   Additional Questions   Roomed by Lois   Accompanied by self          HPI         Heart flutters, breath in fixes  Rare, once every few weeks.       Advance Care Planning            4/22/2025   General Health   How would you rate your overall physical health? Good   Feel stress (tense, anxious, or unable to sleep) Only a little   (!) STRESS CONCERN      4/22/2025   Nutrition   Three or more servings of calcium each day? " Yes   Diet: Regular (no restrictions)   How many servings of fruit and vegetables per day? (!) 2-3   How many sweetened beverages each day? (!) 2         2025   Exercise   Days per week of moderate/strenous exercise 4 days   Average minutes spent exercising at this level 30 min         2025   Social Factors   Frequency of gathering with friends or relatives Three times a week   Worry food won't last until get money to buy more No   Food not last or not have enough money for food? No   Do you have housing? (Housing is defined as stable permanent housing and does not include staying outside in a car, in a tent, in an abandoned building, in an overnight shelter, or couch-surfing.) Yes   Are you worried about losing your housing? No   Lack of transportation? No   Unable to get utilities (heat,electricity)? No         2025   Dental   Dentist two times every year? Yes         Today's PHQ-2 Score:       2025     1:48 PM   PHQ-2 (  [employer])   Q1: Little interest or pleasure in doing things 0   Q2: Feeling down, depressed or hopeless 0   PHQ-2 Score 0    Q1: Little interest or pleasure in doing things Not at all   Q2: Feeling down, depressed or hopeless Not at all   PHQ-2 Score 0       Patient-reported           2025   Substance Use   Alcohol more than 3/day or more than 7/wk No   Do you use any other substances recreationally? No     Social History     Tobacco Use    Smoking status: Former     Current packs/day: 0.00     Types: Cigarettes     Quit date: 2009     Years since quittin.0     Passive exposure: Past    Smokeless tobacco: Never   Vaping Use    Vaping status: Never Used   Substance Use Topics    Alcohol use: Yes     Alcohol/week: 1.0 standard drink of alcohol     Types: 1 Standard drinks or equivalent per week    Drug use: Never     Comment: Drug use: No           2025   LAST FHS-7 RESULTS   1st degree relative breast or ovarian cancer No   Any relative bilateral breast  "cancer No   Any male have breast cancer No   Any ONE woman have BOTH breast AND ovarian cancer No   Any woman with breast cancer before 50yrs No   2 or more relatives with breast AND/OR ovarian cancer No   2 or more relatives with breast AND/OR bowel cancer No                4/22/2025   STI Screening   New sexual partner(s) since last STI/HIV test? No     History of abnormal Pap smear: No - age 30- 64 PAP with HPV every 5 years recommended        Latest Ref Rng & Units 5/3/2023    10:31 AM   PAP / HPV   PAP  Negative for Intraepithelial Lesion or Malignancy (NILM)    HPV 16 DNA Negative Negative    HPV 18 DNA Negative Negative    Other HR HPV Negative Negative      ASCVD Risk   The 10-year ASCVD risk score (Cristian FREEMAN, et al., 2019) is: 0.3%    Values used to calculate the score:      Age: 39 years      Sex: Female      Is Non- : No      Diabetic: No      Tobacco smoker: No      Systolic Blood Pressure: 118 mmHg      Is BP treated: No      HDL Cholesterol: 80 mg/dL      Total Cholesterol: 225 mg/dL       Reviewed and updated as needed this visit by Provider                         Objective    Exam  /74 (BP Location: Right arm, Patient Position: Sitting, Cuff Size: Adult Regular)   Pulse 71   Temp 97.8  F (36.6  C) (Tympanic)   Resp 16   Ht 1.638 m (5' 4.5\")   Wt 86.4 kg (190 lb 6.4 oz)   LMP 04/14/2025   SpO2 99%   BMI 32.18 kg/m     Estimated body mass index is 32.18 kg/m  as calculated from the following:    Height as of this encounter: 1.638 m (5' 4.5\").    Weight as of this encounter: 86.4 kg (190 lb 6.4 oz).    Physical Exam  Constitutional:       Appearance: She is well-developed.   Eyes:      Conjunctiva/sclera: Conjunctivae normal.   Neck:      Thyroid: No thyromegaly.   Cardiovascular:      Rate and Rhythm: Normal rate and regular rhythm.      Heart sounds: Normal heart sounds. No murmur heard.  Pulmonary:      Effort: Pulmonary effort is normal. No respiratory " distress.      Breath sounds: Normal breath sounds.   Abdominal:      Palpations: Abdomen is soft.   Lymphadenopathy:      Cervical: No cervical adenopathy.   Skin:     Findings: No rash.   Neurological:      Mental Status: She is alert and oriented to person, place, and time.               Signed Electronically by: Rhea Sampson MD

## 2025-06-20 ENCOUNTER — RESULTS FOLLOW-UP (OUTPATIENT)
Dept: FAMILY MEDICINE | Facility: OTHER | Age: 40
End: 2025-06-20

## (undated) RX ORDER — DEXAMETHASONE SODIUM PHOSPHATE 10 MG/ML
INJECTION, SOLUTION INTRAMUSCULAR; INTRAVENOUS
Status: DISPENSED
Start: 2024-03-24

## (undated) RX ORDER — ONDANSETRON 4 MG/1
TABLET, ORALLY DISINTEGRATING ORAL
Status: DISPENSED
Start: 2024-03-24